# Patient Record
Sex: FEMALE | Race: WHITE | NOT HISPANIC OR LATINO | Employment: PART TIME | URBAN - METROPOLITAN AREA
[De-identification: names, ages, dates, MRNs, and addresses within clinical notes are randomized per-mention and may not be internally consistent; named-entity substitution may affect disease eponyms.]

---

## 2017-01-10 ENCOUNTER — OFFICE VISIT (OUTPATIENT)
Dept: MEDICAL GROUP | Facility: MEDICAL CENTER | Age: 62
End: 2017-01-10
Payer: MEDICARE

## 2017-01-10 VITALS
SYSTOLIC BLOOD PRESSURE: 126 MMHG | TEMPERATURE: 97.4 F | WEIGHT: 147 LBS | OXYGEN SATURATION: 94 % | HEIGHT: 68 IN | RESPIRATION RATE: 16 BRPM | DIASTOLIC BLOOD PRESSURE: 70 MMHG | BODY MASS INDEX: 22.28 KG/M2 | HEART RATE: 66 BPM

## 2017-01-10 DIAGNOSIS — H92.01 EAR PAIN, RIGHT: ICD-10-CM

## 2017-01-10 DIAGNOSIS — Z85.820 HISTORY OF MELANOMA: ICD-10-CM

## 2017-01-10 DIAGNOSIS — D75.1 POLYCYTHEMIA: ICD-10-CM

## 2017-01-10 DIAGNOSIS — Z12.31 VISIT FOR SCREENING MAMMOGRAM: ICD-10-CM

## 2017-01-10 DIAGNOSIS — K21.9 GASTROESOPHAGEAL REFLUX DISEASE WITHOUT ESOPHAGITIS: ICD-10-CM

## 2017-01-10 DIAGNOSIS — F31.81 BIPOLAR 2 DISORDER (HCC): Chronic | ICD-10-CM

## 2017-01-10 DIAGNOSIS — R73.02 IGT (IMPAIRED GLUCOSE TOLERANCE): ICD-10-CM

## 2017-01-10 DIAGNOSIS — R06.02 SOB (SHORTNESS OF BREATH): ICD-10-CM

## 2017-01-10 DIAGNOSIS — F17.210 CIGARETTE NICOTINE DEPENDENCE WITHOUT COMPLICATION: ICD-10-CM

## 2017-01-10 DIAGNOSIS — Z00.00 INITIAL MEDICARE ANNUAL WELLNESS VISIT: ICD-10-CM

## 2017-01-10 DIAGNOSIS — E78.5 DYSLIPIDEMIA: ICD-10-CM

## 2017-01-10 PROCEDURE — 99214 OFFICE O/P EST MOD 30 MIN: CPT | Mod: 25 | Performed by: INTERNAL MEDICINE

## 2017-01-10 PROCEDURE — G0439 PPPS, SUBSEQ VISIT: HCPCS | Performed by: INTERNAL MEDICINE

## 2017-01-10 RX ORDER — AZITHROMYCIN 250 MG/1
250 TABLET, FILM COATED ORAL DAILY
Qty: 6 TAB | Refills: 0 | Status: SHIPPED | OUTPATIENT
Start: 2017-01-10 | End: 2017-01-15

## 2017-01-10 ASSESSMENT — PATIENT HEALTH QUESTIONNAIRE - PHQ9: CLINICAL INTERPRETATION OF PHQ2 SCORE: 0

## 2017-01-10 NOTE — MR AVS SNAPSHOT
"        Aimee Guillen   1/10/2017 2:20 PM   Office Visit   MRN: [id number]    Department:  [address] [place] [address]   Dept Phone:  [phone]    Description:  Female : 1955   Provider:  Pepe West M.D.           Reason for Visit     Ear Fullness     Sinus Problem     Medication Refill           Allergies as of 1/10/2017     Allergen Noted Reactions    Codeine 2010         You were diagnosed with     Initial Medicare annual wellness visit   [7811929]       Bipolar 2 disorder (HCC)   [344120]       Dyslipidemia   [681860]       Tobacco abuse   [712144]       IGT (impaired glucose tolerance)   [149400]       Gastroesophageal reflux disease without esophagitis   [866388]       Polycythemia   [896590]       History of melanoma   [355614]       Ear pain, right   [990993]       Cigarette nicotine dependence without complication   [514424]       Visit for screening mammogram   [883251]       SOB (shortness of breath)   [230325]         Vital Signs     Blood Pressure Pulse Temperature Respirations Height Weight    126/70 mmHg 66 36.3 °C (97.4 °F) 16 1.727 m (5' 8\") 66.679 kg (147 lb)    Body Mass Index Oxygen Saturation Smoking Status             22.36 kg/m2 94% Current Every Day Smoker         Basic Information     Date Of Birth Sex Race Ethnicity Preferred Language    1955 Female White Non- English      Your appointments     2017  9:00 AM   ANNUAL EXAM PREVENTATIVE with SAM Amos   [facility] [address]    [address] [facility] [address]  [facility] [address]   [phone]              Problem List              ICD-10-CM Priority Class Noted - Resolved    Bipolar 2 disorder (HCC) (Chronic) F31.81   2010 - Present    Dyslipidemia E78.5   2010 - Present    Leukocytosis D72.829   2011 - Present    Polycythemia D75.1   2011 - Present    IGT (impaired glucose tolerance) R73.02   2012 - Present    Gastroesophageal reflux disease without " esophagitis K21.9   1/10/2017 - Present    Cigarette nicotine dependence without complication F17.210   1/10/2017 - Present      Health Maintenance        Date Due Completion Dates    IMM DTaP/Tdap/Td Vaccine (1 - Tdap) 12/15/1975 ---    MAMMOGRAM 9/18/2014 9/18/2013, 8/23/2012, 2/23/2012, 2/15/2012, 5/27/2010, 5/27/2010    PAP SMEAR 2/15/2015 2/15/2012    IMM ZOSTER VACCINE 12/15/2016 ---    COLONOSCOPY 3/18/2022 3/18/2012 (Done)    Override on 3/18/2012: Done            Current Immunizations     Influenza TIV (IM) 9/10/2016, 10/1/2012, 10/1/2010    Pneumococcal Vaccine (UF)Historical Data 10/1/2010    Pneumococcal polysaccharide vaccine (PPSV-23) 10/1/2010      Below and/or attached are the medications your provider expects you to take. Review all of your home medications and newly ordered medications with your provider and/or pharmacist. Follow medication instructions as directed by your provider and/or pharmacist. Please keep your medication list with you and share with your provider. Update the information when medications are discontinued, doses are changed, or new medications (including over-the-counter products) are added; and carry medication information at all times in the event of emergency situations     Allergies:  CODEINE - (reactions not documented)               Medications  Valid as of: January 10, 2017 -  2:48 PM    Generic Name Brand Name Tablet Size Instructions for use    Azithromycin (Tab) ZITHROMAX 250 MG Take 1 Tab by mouth every day for 5 days. Take 2 tabs on day 1        .                 Medicines prescribed today were sent to:     [facility] [id number]  [place], [place] - [address] AT [facility] [address] [address]    Phone: [phone] Fax: [phone]    Open 24 Hours?: No      Medication refill instructions:       If your prescription bottle indicates you have medication refills left, it is not necessary to call your provider’s office. Please  contact your pharmacy and they will refill your medication.    If your prescription bottle indicates you do not have any refills left, you may request refills at any time through one of the following ways: The online Mc4 system (except Urgent Care), by calling your provider’s office, or by asking your pharmacy to contact your provider’s office with a refill request. Medication refills are processed only during regular business hours and may not be available until the next business day. Your provider may request additional information or to have a follow-up visit with you prior to refilling your medication.   *Please Note: Medication refills are assigned a new Rx number when refilled electronically. Your pharmacy may indicate that no refills were authorized even though a new prescription for the same medication is available at the pharmacy. Please request the medicine by name with the pharmacy before contacting your provider for a refill.        Your To Do List     Future Labs/Procedures Complete By Expires    CBC WITH DIFFERENTIAL  As directed 1/11/2018    COMP METABOLIC PANEL  As directed 1/11/2018    LIPID PROFILE  As directed 1/11/2018    MA-SCREEN MAMMO W/CAD-BILAT  As directed 2/11/2018      Referral     A referral request has been sent to our patient care coordination department. Please allow 3-5 business days for us to process this request and contact you either by phone or mail. If you do not hear from us by the 5th business day, please call us at [phone].           Mc4 Access Code: Activation code not generated  Current Mc4 Status: Active

## 2017-01-10 NOTE — PROGRESS NOTES
CC: Reestablish care multiple issues.    HPI:   Aimee presents today with the following.    1. Initial Medicare annual wellness visit  Screenings performed below.    2. Ear pain, right  Main complaint is one month of right-sided ear pain. She denies any fevers or chills but does have some sinus congestion. She notices her ears are severely muffled with some mild ringing but no dizziness. She has no fevers or chills no cough.    3. SOB (shortness of breath)  She does have a long smoking history and does admit to shortness of breath with activity but no chest pain. Over 40-pack-year smoking history without having had pulmonary function test. Denies any acute worsening with the current illness.    4. Polycythemia  Polycythemic and past found to be hypoxic at night with regulation for night maternal O2 which she declines.    5. Cigarette nicotine dependence without complication  Over 40-pack-year smoking history without signs of cancers never had lung cancer screening.    6. Bipolar 2 disorder (HCC)  Mood is clinically doing well she's never seen psychiatry but never had any major breaks.    7. Dyslipidemia  Maintained on statin in the past has not taken for several months but due for blood work.    8. IGT (impaired glucose tolerance)  Blood sugars elevated in the past as well due for repeat blood work.    9. Gastroesophageal reflux disease without esophagitis  Reports symptoms are currently stable no difficulty swallowing of blood disorder or tarry stool.    10. History of melanoma  [place] saw dermatologist diagnosed with noninvasive melanoma does need annual skin checks.    11. Visit for screening mammogram        Depression Screening    Little interest or pleasure in doing things?  0 - not at all  Feeling down, depressed , or hopeless? 0 - not at all  Trouble falling or staying asleep, or sleeping too much?     Feeling tired or having little energy?     Poor appetite or overeating?     Feeling bad  about yourself - or that you are a failure or have let yourself or your family down?    Trouble concentrating on things, such as reading the newspaper or watching television?    Moving or speaking so slowly that other people could have noticed.  Or the opposite - being so fidgety or restless that you have been moving around a lot more than usual?     Thoughts that you would be better off dead, or of hurting yourself?     Patient Health Questionnaire Score:      If depressive symptoms identified deferred to follow up visit unless specifically addressed in assesment and plan.      Screening for Cognitive Impairment    Three Minute Recall (banana, sunrise, fence)  3/3    Draw clock face with all 12 numbers set to the hand to show 10 minures past 11 o'clock  1 5/5  Cognitive concerns identified defferred for follow up unless specifically addressed in assesment and plan.    Fall Risk Assessment    Has the patient had two or more falls in the last year or any fall with injury in the last year?  No    Safety Assessment    Throw rugs on floor.  No  Handrails on all stairs.  No  Good lighting in all hallways.  Yes  Difficulty hearing.  No  Patient counseled about all safety risks that were identified.    Functional Assessment ADLs    Are there any barriers preventing you from cooking for yourself or meeting nutritional needs?  No.    Are there any barriers preventing you from driving safely or obtaining transportation?  No.    Are there any barriers preventing you from using a telephone or calling for help?  No.    Are there any barriers preventing you from shopping?  No.    Are there any barriers preventing you from taking care of your own finances?  No.    Are there any barriers preventing you from managing your medications?  No.    Are currently engaing any exercise or physical activity?  Yes.       Health Maintenance Summary                Annual Wellness Visit Overdue 1955     IMM DTaP/Tdap/Td Vaccine Overdue  "12/15/1975     LUNG CANCER SCREENING Overdue 12/15/2011     MAMMOGRAM Overdue 9/18/2014      Done 9/18/2013 MA-BILAT DIAGNOSTIC MAMMO W/CAD     Patient has more history with this topic...    PAP SMEAR Overdue 2/15/2015      Done 2/15/2012 PAP IG, RFX HPV ASCU    IMM ZOSTER VACCINE Overdue 12/15/2016     COLONOSCOPY Next Due 3/18/2022      Done 3/18/2012           Patient Care Team:  Pepe West M.D. as PCP - General      Patient Active Problem List    Diagnosis Date Noted   • Gastroesophageal reflux disease without esophagitis 01/10/2017   • Cigarette nicotine dependence without complication 01/10/2017   • IGT (impaired glucose tolerance) 11/19/2012   • Leukocytosis 05/31/2011   • Polycythemia 05/31/2011   • Bipolar 2 disorder (HCC) 04/30/2010   • Dyslipidemia 04/30/2010       Current Outpatient Prescriptions   Medication Sig Dispense Refill   • azithromycin (ZITHROMAX Z-BRIANA) 250 MG Tab Take 1 Tab by mouth every day for 5 days. Take 2 tabs on day 1 6 Tab 0     No current facility-administered medications for this visit.         Allergies as of 01/10/2017 - Eddie as Reviewed 01/10/2017   Allergen Reaction Noted   • Codeine  05/20/2010        ROS: As per HPI.    /70 mmHg  Pulse 66  Temp(Src) 36.3 °C (97.4 °F)  Resp 16  Ht 1.727 m (5' 8\")  Wt 66.679 kg (147 lb)  BMI 22.36 kg/m2  SpO2 94%    Physical Exam:  Gen:         Alert and oriented, No apparent distress.  Heent:       TMs clear bilaterally, oropharynx without erythema or exudates  Neck:        No Lymphadenopathy or Bruits.  Lungs:     Clear to auscultation bilaterally  CV:          Regular rate and rhythm. No murmurs, rubs or gallops.  Abd:         Soft non tender, non distended. Normal active bowel sounds.  No  Hepatosplenomegaly, No pulsatile masses.                   Ext:          No clubbing, cyanosis, edema.      Assessment and Plan.   61 y.o. female with the following issues.    1. Initial Medicare annual wellness visit  Discussed healthy " lifestyle habits as well as screening regimens. Discussed advanced directives  - Initial Wellness Visit - Includes PPPS ()    2. Ear pain, right  No obvious findings were given here loss and duration of symptoms have placed on anti-biotic if not improving referral to ENT.  - azithromycin (ZITHROMAX Z-BRIAAN) 250 MG Tab; Take 1 Tab by mouth every day for 5 days. Take 2 tabs on day 1  Dispense: 6 Tab; Refill: 0    3. SOB (shortness of breath)  Nothing acute nature have written for pulmonary function test.  - PULMONARY FUNCTION TESTS Test requested: Complete Pulmonary Function Test    4. Polycythemia  Rechecking CBC.  - Initial Wellness Visit - Includes PPPS ()  - CBC WITH DIFFERENTIAL; Future    5. Cigarette nicotine dependence without complication  Given her duration of smoking have placed referral to lung cancer screening program.  - REFERRAL TO LUNG CANCER SCREENING PROGRAM  - PULMONARY FUNCTION TESTS Test requested: Complete Pulmonary Function Test    6. Bipolar 2 disorder (HCC)  Clinical history stable no change to therapy  - Initial Wellness Visit - Includes PPPS ()    7. Dyslipidemia  Recheck cholesterol likely start back on statin.  - Initial Wellness Visit - Includes PPPS ()  - COMP METABOLIC PANEL; Future  - LIPID PROFILE; Future    8. IGT (impaired glucose tolerance)  Rechecking blood work with A1c.  - Initial Wellness Visit - Includes PPPS ()  - HEMOGLOBIN A1C; Future    9. Gastroesophageal reflux disease without esophagitis  Clinically stable no change to therapy.  - Initial Wellness Visit - Includes PPPS ()    10. History of melanoma  Referring to dermatology.  - REFERRAL TO DERMATOLOGY    11. Visit for screening mammogram    - MA-SCREEN MAMMO W/CAD-BILAT; Future

## 2017-01-16 RX ORDER — CIPROFLOXACIN 0.5 MG/.25ML
2 SOLUTION/ DROPS AURICULAR (OTIC) EVERY 6 HOURS
Qty: 1 EACH | Refills: 0 | Status: SHIPPED | OUTPATIENT
Start: 2017-01-16 | End: 2018-01-17

## 2017-01-17 ENCOUNTER — HOSPITAL ENCOUNTER (OUTPATIENT)
Dept: LAB | Facility: MEDICAL CENTER | Age: 62
End: 2017-01-17
Attending: INTERNAL MEDICINE
Payer: MEDICARE

## 2017-01-17 DIAGNOSIS — E78.5 DYSLIPIDEMIA: ICD-10-CM

## 2017-01-17 DIAGNOSIS — D75.1 POLYCYTHEMIA: ICD-10-CM

## 2017-01-17 PROBLEM — K21.9 GASTROESOPHAGEAL REFLUX DISEASE WITHOUT ESOPHAGITIS: Status: RESOLVED | Noted: 2017-01-10 | Resolved: 2017-01-17

## 2017-01-17 LAB
ALBUMIN SERPL BCP-MCNC: 4.5 G/DL (ref 3.2–4.9)
ALBUMIN/GLOB SERPL: 1.5 G/DL
ALP SERPL-CCNC: 97 U/L (ref 30–99)
ALT SERPL-CCNC: 13 U/L (ref 2–50)
ANION GAP SERPL CALC-SCNC: 5 MMOL/L (ref 0–11.9)
AST SERPL-CCNC: 18 U/L (ref 12–45)
BASOPHILS # BLD AUTO: 0.13 K/UL (ref 0–0.12)
BASOPHILS NFR BLD AUTO: 1.2 % (ref 0–1.8)
BILIRUB SERPL-MCNC: 0.5 MG/DL (ref 0.1–1.5)
BUN SERPL-MCNC: 16 MG/DL (ref 8–22)
CALCIUM SERPL-MCNC: 9.7 MG/DL (ref 8.5–10.5)
CHLORIDE SERPL-SCNC: 106 MMOL/L (ref 96–112)
CHOLEST SERPL-MCNC: 294 MG/DL (ref 100–199)
CO2 SERPL-SCNC: 28 MMOL/L (ref 20–33)
CREAT SERPL-MCNC: 0.78 MG/DL (ref 0.5–1.4)
EOSINOPHIL # BLD: 0.15 K/UL (ref 0–0.51)
EOSINOPHIL NFR BLD AUTO: 1.4 % (ref 0–6.9)
ERYTHROCYTE [DISTWIDTH] IN BLOOD BY AUTOMATED COUNT: 48.2 FL (ref 35.9–50)
GLOBULIN SER CALC-MCNC: 3 G/DL (ref 1.9–3.5)
GLUCOSE SERPL-MCNC: 94 MG/DL (ref 65–99)
HCT VFR BLD AUTO: 50 % (ref 37–47)
HDLC SERPL-MCNC: 41 MG/DL
HGB BLD-MCNC: 17.5 G/DL (ref 12–16)
IMM GRANULOCYTES # BLD AUTO: 0.03 K/UL (ref 0–0.11)
IMM GRANULOCYTES NFR BLD AUTO: 0.3 % (ref 0–0.9)
LDLC SERPL CALC-MCNC: 186 MG/DL
LYMPHOCYTES # BLD: 3.26 K/UL (ref 1–4.8)
LYMPHOCYTES NFR BLD AUTO: 29.7 % (ref 22–41)
MCH RBC QN AUTO: 32.7 PG (ref 27–33)
MCHC RBC AUTO-ENTMCNC: 35 G/DL (ref 33.6–35)
MCV RBC AUTO: 93.5 FL (ref 81.4–97.8)
MONOCYTES # BLD: 0.55 K/UL (ref 0–0.85)
MONOCYTES NFR BLD AUTO: 5 % (ref 0–13.4)
NEUTROPHILS # BLD: 6.85 K/UL (ref 2–7.15)
NEUTROPHILS NFR BLD AUTO: 62.4 % (ref 44–72)
NRBC # BLD AUTO: 0 K/UL
NRBC BLD-RTO: 0 /100 WBC
PLATELET # BLD AUTO: 213 K/UL (ref 164–446)
PMV BLD AUTO: 12.5 FL (ref 9–12.9)
POTASSIUM SERPL-SCNC: 4.4 MMOL/L (ref 3.6–5.5)
PROT SERPL-MCNC: 7.5 G/DL (ref 6–8.2)
RBC # BLD AUTO: 5.35 M/UL (ref 4.2–5.4)
SODIUM SERPL-SCNC: 139 MMOL/L (ref 135–145)
TRIGL SERPL-MCNC: 337 MG/DL (ref 0–149)
WBC # BLD AUTO: 11 K/UL (ref 4.8–10.8)

## 2017-01-17 PROCEDURE — 80061 LIPID PANEL: CPT

## 2017-01-17 PROCEDURE — 36415 COLL VENOUS BLD VENIPUNCTURE: CPT

## 2017-01-17 PROCEDURE — 85025 COMPLETE CBC W/AUTO DIFF WBC: CPT

## 2017-01-17 PROCEDURE — 80053 COMPREHEN METABOLIC PANEL: CPT

## 2017-01-19 ENCOUNTER — OFFICE VISIT (OUTPATIENT)
Dept: HEMATOLOGY ONCOLOGY | Facility: MEDICAL CENTER | Age: 62
End: 2017-01-19
Payer: MEDICARE

## 2017-01-19 VITALS
BODY MASS INDEX: 22.55 KG/M2 | HEIGHT: 68 IN | OXYGEN SATURATION: 99 % | RESPIRATION RATE: 16 BRPM | WEIGHT: 148.8 LBS | TEMPERATURE: 98.4 F | SYSTOLIC BLOOD PRESSURE: 108 MMHG | DIASTOLIC BLOOD PRESSURE: 80 MMHG | HEART RATE: 82 BPM

## 2017-01-19 DIAGNOSIS — F17.210 CIGARETTE SMOKER: ICD-10-CM

## 2017-01-19 ASSESSMENT — ENCOUNTER SYMPTOMS
WEIGHT LOSS: 0
HEMOPTYSIS: 0
COUGH: 0
SPUTUM PRODUCTION: 0
WHEEZING: 0
FEVER: 0
SHORTNESS OF BREATH: 1
CHILLS: 0

## 2017-01-19 NOTE — PROGRESS NOTES
"Subjective:   Date of Consultation:1/19/2017  8:57 AM  Primary care physician:Pepe eWst M.D.    Patient seen today for initial lung cancer screening visit. Patient referred by Dr. Pepe West.    The patient meets eligibility criteria including age, smoking history (30+ pack years), if former smoker, quit in the last 15 years, and absence of signs or symptoms of lung cancer.    - Age   - Smoking history - Patient has smoked for 60years at an average of 46 ppd = 1 pack year smoking history.  - Current smoking status - Current smoker.  Patient has not attempted to quit.  - No symptoms of lung cancer and no previous history of lung cancer           Past Medical History   Diagnosis Date   • Bipolar 2 disorder (CMS-HCC) 4/30/2010   • HTN (hypertension), benign 4/30/2010     Past Surgical History   Procedure Laterality Date   • Cholecystectomy     • Primary c section  1978     Allergies   Allergen Reactions   • Codeine         History   Smoking status   • Current Every Day Smoker -- 1.00 packs/day for 46 years   • Types: Cigarettes   • Start date: 11/15/1971   Smokeless tobacco   • Never Used     History   Alcohol Use No         Family History   Problem Relation Age of Onset   • Cancer Mother      breast   • Cancer Sister      colon   • Heart Disease Maternal Grandmother    • Cancer Father      stomach       Review of Systems   Constitutional: Negative for fever, chills, weight loss and malaise/fatigue.   Respiratory: Positive for shortness of breath (with activity). Negative for cough, hemoptysis, sputum production and wheezing.         Patient c/o SOB which started 10 years ago and becoming worse.  Patient does not have a diagnosis of COPD/Asthma or medication to alleviate her symptom.        Objective:   /80 mmHg  Pulse 82  Temp(Src) 36.9 °C (98.4 °F)  Resp 16  Ht 1.727 m (5' 7.99\")  Wt 67.495 kg (148 lb 12.8 oz)  BMI 22.63 kg/m2  SpO2 99%  Breastfeeding? No    Physical Exam    Assessment:     We " conducted a shared decision-making process using a decision aid. We reviewed benefits and harms of screening, including false positives and potential need for additional diagnostic testing, the possibility of over diagnosis, and total radiation exposure.    We discussed the importance of adhering to annual LDCT screening. We also discussed the impact of comorbities on the patient's the ability or willingness to undergo diagnostic procedure(s) and treatment.    Counseling on the importance of maintaining cigarette smoking abstinence if former smoker; or the importance of smoking cessation if current smoker and, if appropriate, furnishing of information about tobacco cessation interventions.    Based on our discussion, we have decided not to initiate regular lung cancer screening.    Medical Decision Making:  Today's Assessment / Status / Plan:   Patient is not eligible for the low dose CT scan as she does not have a diagnosis  to explain her SOB which is progressively becoming worse.  I will notify Dr. West.

## 2017-01-19 NOTE — MR AVS SNAPSHOT
"        Aimee SANTIAGO Mindy   2017 9:00 AM   Office Visit   MRN: [id number]    Department:  [facility]   Dept Phone:  [phone]    Description:  Female : 1955   Provider:  SAM Ruiz           Reason for Visit     Other Lung Ca screening      Allergies as of 2017     Allergen Noted Reactions    Codeine 2010         You were diagnosed with     Cigarette smoker   [550749]         Vital Signs     Blood Pressure Pulse Temperature Respirations Height Weight    108/80 mmHg 82 36.9 °C (98.4 °F) 16 1.727 m (5' 7.99\") 67.495 kg (148 lb 12.8 oz)    Body Mass Index Oxygen Saturation Breastfeeding? Smoking Status          22.63 kg/m2 99% No Current Every Day Smoker        Basic Information     Date Of Birth Sex Race Ethnicity Preferred Language    1955 Female White Non- English      Your appointments     2017  2:00 PM   Pulmonary Function Test with PULMONARY FUNCTION LAB   PULMONARY LAB OP [facility] (--)    [address] [place] [address]   [phone]            2017  9:00 AM   ANNUAL EXAM PREVENTATIVE with SAM Amos   [facility] [address]    [address]  [address]  [place] [address]   [phone]            Mar 03, 2017  9:10 AM   MA [id number] with [facility] MG 3   [facility] ([address]    [address]  [address]  [place] [address]   [phone]           No deodorant, powder, perfume or lotion under the arm or breast area.              Problem List              ICD-10-CM Priority Class Noted - Resolved    Bipolar 2 disorder (CMS-HCC) (Chronic) F31.81   2010 - Present    Dyslipidemia E78.5   2010 - Present    Leukocytosis D72.829   2011 - Present    Polycythemia D75.1   2011 - Present    Cigarette nicotine dependence without complication F17.210   1/10/2017 - Present      Health Maintenance        Date Due Completion Dates    IMM DTaP/Tdap/Td Vaccine (1 - Tdap) 12/15/1975 " ---    MAMMOGRAM 9/18/2014 9/18/2013, 8/23/2012, 2/23/2012, 2/15/2012, 5/27/2010, 5/27/2010    PAP SMEAR 2/15/2015 2/15/2012    IMM ZOSTER VACCINE 12/15/2016 ---    COLONOSCOPY 3/18/2022 3/18/2012 (Done)    Override on 3/18/2012: Done            Current Immunizations     Influenza TIV (IM) 9/10/2016, 10/1/2012, 10/1/2010    Pneumococcal Vaccine (UF)Historical Data 10/1/2010    Pneumococcal polysaccharide vaccine (PPSV-23) 10/1/2010      Below and/or attached are the medications your provider expects you to take. Review all of your home medications and newly ordered medications with your provider and/or pharmacist. Follow medication instructions as directed by your provider and/or pharmacist. Please keep your medication list with you and share with your provider. Update the information when medications are discontinued, doses are changed, or new medications (including over-the-counter products) are added; and carry medication information at all times in the event of emergency situations     Allergies:  CODEINE - (reactions not documented)               Medications  Valid as of: January 19, 2017 -  9:58 AM    Generic Name Brand Name Tablet Size Instructions for use    Ciprofloxacin HCl (Solution) Ciprofloxacin HCl 0.2 % Place 2 Drops in ear every 6 hours.        .                 Medicines prescribed today were sent to:     [facility] [address] [place] [address] [address] [facility] [address]    [facility] [address] [facility] [address]    Phone: [phone] Fax: [phone]    Open 24 Hours?: No      Medication refill instructions:       If your prescription bottle indicates you have medication refills left, it is not necessary to call your provider’s office. Please contact your pharmacy and they will refill your medication.    If your prescription bottle indicates you do not have any refills left, you may request refills at any time through one of the following ways: The online Synup system (except  Urgent Care), by calling your provider’s office, or by asking your pharmacy to contact your provider’s office with a refill request. Medication refills are processed only during regular business hours and may not be available until the next business day. Your provider may request additional information or to have a follow-up visit with you prior to refilling your medication.   *Please Note: Medication refills are assigned a new Rx number when refilled electronically. Your pharmacy may indicate that no refills were authorized even though a new prescription for the same medication is available at the pharmacy. Please request the medicine by name with the pharmacy before contacting your provider for a refill.           [employer] System[employer] Access Code: Activation code not generated  Current SodaHead Status: Active

## 2017-01-23 ENCOUNTER — APPOINTMENT (OUTPATIENT)
Dept: OTHER | Facility: MEDICAL CENTER | Age: 62
End: 2017-01-23
Payer: MEDICARE

## 2017-01-23 ENCOUNTER — OFFICE VISIT (OUTPATIENT)
Dept: MEDICAL GROUP | Facility: MEDICAL CENTER | Age: 62
End: 2017-01-23
Payer: MEDICARE

## 2017-01-23 VITALS
BODY MASS INDEX: 22.28 KG/M2 | TEMPERATURE: 97.3 F | HEART RATE: 72 BPM | HEIGHT: 68 IN | RESPIRATION RATE: 16 BRPM | DIASTOLIC BLOOD PRESSURE: 64 MMHG | SYSTOLIC BLOOD PRESSURE: 130 MMHG | WEIGHT: 147 LBS | OXYGEN SATURATION: 97 %

## 2017-01-23 DIAGNOSIS — E78.5 DYSLIPIDEMIA: ICD-10-CM

## 2017-01-23 DIAGNOSIS — H93.8X1 EAR FULLNESS, RIGHT: ICD-10-CM

## 2017-01-23 DIAGNOSIS — F17.210 CIGARETTE NICOTINE DEPENDENCE WITHOUT COMPLICATION: ICD-10-CM

## 2017-01-23 PROBLEM — J44.9 CHRONIC OBSTRUCTIVE PULMONARY DISEASE (HCC): Status: ACTIVE | Noted: 2017-01-23

## 2017-01-23 PROCEDURE — 99214 OFFICE O/P EST MOD 30 MIN: CPT | Performed by: INTERNAL MEDICINE

## 2017-01-23 RX ORDER — ATORVASTATIN CALCIUM 40 MG/1
40 TABLET, FILM COATED ORAL DAILY
Qty: 90 TAB | Refills: 3 | Status: SHIPPED | OUTPATIENT
Start: 2017-01-23 | End: 2017-12-01 | Stop reason: SDUPTHER

## 2017-01-23 RX ORDER — FLUTICASONE PROPIONATE 50 MCG
1 SPRAY, SUSPENSION (ML) NASAL DAILY
Qty: 16 G | Refills: 6 | Status: SHIPPED | OUTPATIENT
Start: 2017-01-23 | End: 2018-01-17

## 2017-01-23 ASSESSMENT — PATIENT HEALTH QUESTIONNAIRE - PHQ9: CLINICAL INTERPRETATION OF PHQ2 SCORE: 0

## 2017-01-23 NOTE — PROGRESS NOTES
"CC: Follow-up multiple issues    HPI:   Aimee presents today with the following.    1. Dyslipidemia  Presents after having blood work showing a significantly elevated LDL. She has been on statins in the past without myalgias. She denies any chest pain or shortness of breath no edema.    2. Cigarette nicotine dependence without complication  Significant smoking history and she does have some dyspnea on exertion however with her smoking history this certainly is not a sign or symptom of cancer. She is willing to undergo treatment if she does have an abnormal finding on CT. Shared decision making with the nurse was declined however she is very interested in the CT.    3. Ear fullness, right  She is complaining of persistent right ear fullness. She was given some drops which were not helpful. She has no fevers or chills no other infectious symptoms.      Patient Active Problem List    Diagnosis Date Noted   • Chronic obstructive pulmonary disease (CMS-HCC) 01/23/2017   • Cigarette nicotine dependence without complication 01/10/2017   • Polycythemia 05/31/2011   • Bipolar 2 disorder (CMS-HCC) 04/30/2010   • Dyslipidemia 04/30/2010       Current Outpatient Prescriptions   Medication Sig Dispense Refill   • atorvastatin (LIPITOR) 40 MG Tab Take 1 Tab by mouth every day. 90 Tab 3   • fluticasone (FLONASE) 50 MCG/ACT nasal spray Spray 1 Spray in nose every day. 16 g 6   • Ciprofloxacin HCl 0.2 % Solution Place 2 Drops in ear every 6 hours. 1 Each 0     No current facility-administered medications for this visit.         Allergies as of 01/23/2017 - Eddie as Reviewed 01/23/2017   Allergen Reaction Noted   • Codeine  05/20/2010        ROS: As per HPI.    /64 mmHg  Pulse 72  Temp(Src) 36.3 °C (97.3 °F)  Resp 16  Ht 1.727 m (5' 8\")  Wt 66.679 kg (147 lb)  BMI 22.36 kg/m2  SpO2 97%    Physical Exam:  Gen:         Alert and oriented, No apparent distress.  Heent:       TMs clear bilaterally, oropharynx without " erythema or exudates  Neck:        No Lymphadenopathy or Bruits.  Lungs:     Clear to auscultation bilaterally  CV:          Regular rate and rhythm. No murmurs, rubs or gallops.               Ext:          No clubbing, cyanosis, edema.      Assessment and Plan.   61 y.o. female with the following issues.    1. Dyslipidemia  Starting on Lipitor 40 mg cautioned about side effects recheck 3 months.  - COMP METABOLIC PANEL; Future  - LIPID PROFILE; Future    2. Cigarette nicotine dependence without complication  Shared decision making today do not constitute any of her symptoms and signs of cancer have reordered the CT. She also has spirometry pending for what is likely COPD given her long history of smoking.  - CT-LUNG CANCER-SCREENING; Future    3. Ear fullness, right  Likely eustachian tube dysfunction have started on nasal steroid-dependent.  - fluticasone (FLONASE) 50 MCG/ACT nasal spray; Spray 1 Spray in nose every day.  Dispense: 16 g; Refill: 6

## 2017-01-23 NOTE — MR AVS SNAPSHOT
"Susanerine JACK Mindy   2017 9:20 AM   Office Visit   MRN: [id number]    Department:  [address] [place] [address]   Dept Phone:  [phone]    Description:  Female : 1955   Provider:  Pepe West M.D.           Reason for Visit     Follow-Up Labs      Allergies as of 2017     Allergen Noted Reactions    Codeine 2010         You were diagnosed with     Dyslipidemia   [454365]       Cigarette nicotine dependence without complication   [365034]       Ear fullness, right   [7592443]         Vital Signs     Blood Pressure Pulse Temperature Respirations Height Weight    130/64 mmHg 72 36.3 °C (97.3 °F) 16 1.727 m (5' 8\") 66.679 kg (147 lb)    Body Mass Index Oxygen Saturation Smoking Status             22.36 kg/m2 97% Current Every Day Smoker         Basic Information     Date Of Birth Sex Race Ethnicity Preferred Language    1955 Female White Non- English      Your appointments     2017  2:00 PM   Pulmonary Function Test with PULMONARY FUNCTION LAB   PULMONARY LAB OP [facility] (--)    [address] [facility] [place] [address]   [phone]            2017  9:00 AM   ANNUAL EXAM PREVENTATIVE with SAM Amos   [facility] [address]    [address]  [address]  [place] [address] [phone]            Mar 03, 2017  9:10 AM   MA [id number] with RBHC MG 3   [facility] [address]    [address]  [place] [address] [phone]           No deodorant, powder, perfume or lotion under the arm or breast area.              Problem List              ICD-10-CM Priority Class Noted - Resolved    Bipolar 2 disorder (CMS-HCC) (Chronic) F31.81   2010 - Present    Dyslipidemia E78.5   2010 - Present    Polycythemia D75.1   2011 - Present    Cigarette nicotine dependence without complication F17.210   1/10/2017 - Present    Chronic obstructive pulmonary disease (CMS-HCC) J44.9   2017 - Present      "   Health Maintenance        Date Due Completion Dates    IMM DTaP/Tdap/Td Vaccine (1 - Tdap) 12/15/1975 ---    MAMMOGRAM 9/18/2014 9/18/2013, 8/23/2012, 2/23/2012, 2/15/2012, 5/27/2010, 5/27/2010    PAP SMEAR 2/15/2015 2/15/2012    IMM ZOSTER VACCINE 12/15/2016 ---    COLONOSCOPY 3/18/2022 3/18/2012 (Done)    Override on 3/18/2012: Done            Current Immunizations     Influenza TIV (IM) 9/10/2016, 10/1/2012, 10/1/2010    Pneumococcal Vaccine (UF)Historical Data 10/1/2010    Pneumococcal polysaccharide vaccine (PPSV-23) 10/1/2010      Below and/or attached are the medications your provider expects you to take. Review all of your home medications and newly ordered medications with your provider and/or pharmacist. Follow medication instructions as directed by your provider and/or pharmacist. Please keep your medication list with you and share with your provider. Update the information when medications are discontinued, doses are changed, or new medications (including over-the-counter products) are added; and carry medication information at all times in the event of emergency situations     Allergies:  CODEINE - (reactions not documented)               Medications  Valid as of: January 23, 2017 -  9:44 AM    Generic Name Brand Name Tablet Size Instructions for use    Atorvastatin Calcium (Tab) LIPITOR 40 MG Take 1 Tab by mouth every day.        Ciprofloxacin HCl (Solution) Ciprofloxacin HCl 0.2 % Place 2 Drops in ear every 6 hours.        Fluticasone Propionate (Suspension) FLONASE 50 MCG/ACT Spray 1 Spray in nose every day.        .                 Medicines prescribed today were sent to:     [id number]  [place] - [address] [facility] [address] [place] [address]    Phone: [phone] Fax: [phone]    Open 24 Hours?: No      Medication refill instructions:       If your prescription bottle indicates you have medication refills left, it is not necessary to  call your provider’s office. Please contact your pharmacy and they will refill your medication.    If your prescription bottle indicates you do not have any refills left, you may request refills at any time through one of the following ways: The online Sypherlink system (except Urgent Care), by calling your provider’s office, or by asking your pharmacy to contact your provider’s office with a refill request. Medication refills are processed only during regular business hours and may not be available until the next business day. Your provider may request additional information or to have a follow-up visit with you prior to refilling your medication.   *Please Note: Medication refills are assigned a new Rx number when refilled electronically. Your pharmacy may indicate that no refills were authorized even though a new prescription for the same medication is available at the pharmacy. Please request the medicine by name with the pharmacy before contacting your provider for a refill.        Your To Do List     Future Labs/Procedures Complete By Expires    COMP METABOLIC PANEL  As directed 1/24/2018    CT-LUNG CANCER-SCREENING  As directed 1/23/2018    LIPID PROFILE  As directed 1/24/2018         Sypherlink Access Code: Activation code not generated  Current Sypherlink Status: Active

## 2017-01-25 ENCOUNTER — HOSPITAL ENCOUNTER (OUTPATIENT)
Facility: MEDICAL CENTER | Age: 62
End: 2017-01-25
Attending: NURSE PRACTITIONER
Payer: MEDICARE

## 2017-01-25 ENCOUNTER — OFFICE VISIT (OUTPATIENT)
Dept: MEDICAL GROUP | Facility: MEDICAL CENTER | Age: 62
End: 2017-01-25
Payer: MEDICARE

## 2017-01-25 VITALS
WEIGHT: 145.4 LBS | OXYGEN SATURATION: 95 % | DIASTOLIC BLOOD PRESSURE: 62 MMHG | HEIGHT: 68 IN | SYSTOLIC BLOOD PRESSURE: 110 MMHG | BODY MASS INDEX: 22.04 KG/M2 | HEART RATE: 76 BPM

## 2017-01-25 DIAGNOSIS — Z11.51 SPECIAL SCREENING EXAMINATION FOR HUMAN PAPILLOMAVIRUS (HPV): ICD-10-CM

## 2017-01-25 DIAGNOSIS — Z01.419 WELL WOMAN EXAM WITH ROUTINE GYNECOLOGICAL EXAM: ICD-10-CM

## 2017-01-25 DIAGNOSIS — N39.0 UTI (URINARY TRACT INFECTION), UNCOMPLICATED: ICD-10-CM

## 2017-01-25 DIAGNOSIS — Z12.4 PAP SMEAR FOR CERVICAL CANCER SCREENING: ICD-10-CM

## 2017-01-25 LAB
APPEARANCE UR: CLEAR
BILIRUB UR STRIP-MCNC: ABNORMAL MG/DL
COLOR UR AUTO: YELLOW
GLUCOSE UR STRIP.AUTO-MCNC: ABNORMAL MG/DL
KETONES UR STRIP.AUTO-MCNC: ABNORMAL MG/DL
LEUKOCYTE ESTERASE UR QL STRIP.AUTO: ABNORMAL
NITRITE UR QL STRIP.AUTO: ABNORMAL
PH UR STRIP.AUTO: 6.5 [PH] (ref 5–8)
PROT UR QL STRIP: ABNORMAL MG/DL
RBC UR QL AUTO: ABNORMAL
SP GR UR STRIP.AUTO: 1.01
UROBILINOGEN UR STRIP-MCNC: ABNORMAL MG/DL

## 2017-01-25 PROCEDURE — 87624 HPV HI-RISK TYP POOLED RSLT: CPT

## 2017-01-25 PROCEDURE — 87480 CANDIDA DNA DIR PROBE: CPT

## 2017-01-25 PROCEDURE — G8420 CALC BMI NORM PARAMETERS: HCPCS | Performed by: NURSE PRACTITIONER

## 2017-01-25 PROCEDURE — 99213 OFFICE O/P EST LOW 20 MIN: CPT | Mod: 25 | Performed by: NURSE PRACTITIONER

## 2017-01-25 PROCEDURE — 81002 URINALYSIS NONAUTO W/O SCOPE: CPT | Performed by: NURSE PRACTITIONER

## 2017-01-25 PROCEDURE — 87660 TRICHOMONAS VAGIN DIR PROBE: CPT

## 2017-01-25 PROCEDURE — 87086 URINE CULTURE/COLONY COUNT: CPT

## 2017-01-25 PROCEDURE — G8482 FLU IMMUNIZE ORDER/ADMIN: HCPCS | Performed by: NURSE PRACTITIONER

## 2017-01-25 PROCEDURE — 4004F PT TOBACCO SCREEN RCVD TLK: CPT | Performed by: NURSE PRACTITIONER

## 2017-01-25 PROCEDURE — G0101 CA SCREEN;PELVIC/BREAST EXAM: HCPCS | Mod: 25 | Performed by: NURSE PRACTITIONER

## 2017-01-25 PROCEDURE — 88175 CYTOPATH C/V AUTO FLUID REDO: CPT

## 2017-01-25 PROCEDURE — 87510 GARDNER VAG DNA DIR PROBE: CPT

## 2017-01-25 PROCEDURE — 3017F COLORECTAL CA SCREEN DOC REV: CPT | Performed by: NURSE PRACTITIONER

## 2017-01-25 PROCEDURE — 3014F SCREEN MAMMO DOC REV: CPT | Performed by: NURSE PRACTITIONER

## 2017-01-25 RX ORDER — SULFAMETHOXAZOLE AND TRIMETHOPRIM 800; 160 MG/1; MG/1
1 TABLET ORAL 2 TIMES DAILY
Qty: 20 TAB | Refills: 0 | Status: SHIPPED | OUTPATIENT
Start: 2017-01-25 | End: 2017-02-04

## 2017-01-25 NOTE — MR AVS SNAPSHOT
"Aimee Guillen   2017 9:00 AM   Office Visit   MRN: [id number]    Department:  [address] [place] [address]   Dept Phone:  [phone]    Description:  Female : 1955   Provider:  SAM Amos           Reason for Visit     Gynecologic Exam     Urinary Pain 2 episodes of painful urination in the morning over the last year      Allergies as of 2017     Allergen Noted Reactions    Codeine 2010         You were diagnosed with     UTI (urinary tract infection), uncomplicated   [990357]   poct urine is pos. today for leukocytes and trace blood. We'll send for culture. Start Bactrim DS 10 days. Follow-up after antibiotics for recheck. ER precautio    Pap smear for cervical cancer screening   [300079]       Well woman exam with routine gynecological exam   [552509]       Special screening examination for human papillomavirus (HPV)   [V73.81.ICD-9-CM]         Vital Signs     Blood Pressure Pulse Height Weight Body Mass Index Oxygen Saturation    110/62 mmHg 76 1.727 m (5' 7.99\") 65.953 kg (145 lb 6.4 oz) 22.11 kg/m2 95%    Breastfeeding? Smoking Status                No Current Every Day Smoker          Basic Information     Date Of Birth Sex Race Ethnicity Preferred Language    1955 Female White Non- English      Your appointments     2017 10:40 AM   Established Patient with SAM Amos   [facility] [address]    [address] [facility] [address]  [place] [address] [phone]           You will be receiving a confirmation call a few days before your appointment from our automated call confirmation system.            2017  8:00 AM   Pulmonary Function Test with PULMONARY FUNCTION LAB   PULMONARY LAB OP C (--)    [address] [facility]  [place] [address] [phone]            Mar 03, 2017  9:10 AM   TREVOR [id number] with RBHC MG 3   [facility] [address]    [address]  [place] [address]   "   [phone]           No deodorant, powder, perfume or lotion under the arm or breast area.              Problem List              ICD-10-CM Priority Class Noted - Resolved    Bipolar 2 disorder (CMS-HCC) (Chronic) F31.81   4/30/2010 - Present    Dyslipidemia E78.5   4/30/2010 - Present    Polycythemia D75.1   5/31/2011 - Present    Cigarette nicotine dependence without complication F17.210   1/10/2017 - Present    Chronic obstructive pulmonary disease (CMS-HCC) J44.9   1/23/2017 - Present    UTI (urinary tract infection), uncomplicated N39.0   1/25/2017 - Present      Health Maintenance        Date Due Completion Dates    IMM DTaP/Tdap/Td Vaccine (1 - Tdap) 12/15/1975 ---    MAMMOGRAM 9/18/2014 9/18/2013, 8/23/2012, 2/23/2012, 2/15/2012, 5/27/2010, 5/27/2010    PAP SMEAR 2/15/2015 2/15/2012    IMM ZOSTER VACCINE 12/15/2016 ---    COLONOSCOPY 3/18/2022 3/18/2012 (Done)    Override on 3/18/2012: Done            Current Immunizations     Influenza TIV (IM) 9/10/2016, 10/1/2012, 10/1/2010    Pneumococcal Vaccine (UF)Historical Data 10/1/2010    Pneumococcal polysaccharide vaccine (PPSV-23) 10/1/2010      Below and/or attached are the medications your provider expects you to take. Review all of your home medications and newly ordered medications with your provider and/or pharmacist. Follow medication instructions as directed by your provider and/or pharmacist. Please keep your medication list with you and share with your provider. Update the information when medications are discontinued, doses are changed, or new medications (including over-the-counter products) are added; and carry medication information at all times in the event of emergency situations     Allergies:  CODEINE - (reactions not documented)               Medications  Valid as of: January 25, 2017 -  9:54 AM    Generic Name Brand Name Tablet Size Instructions for use    Atorvastatin Calcium (Tab) LIPITOR 40 MG Take 1 Tab by mouth every day.         Ciprofloxacin HCl (Solution) Ciprofloxacin HCl 0.2 % Place 2 Drops in ear every 6 hours.        Fluticasone Propionate (Suspension) FLONASE 50 MCG/ACT Spray 1 Spray in nose every day.        Sulfamethoxazole-Trimethoprim (Tab) BACTRIM -160 MG Take 1 Tab by mouth 2 Times a Day for 10 days.        .                 Medicines prescribed today were sent to:     [id number] - [place], [place] - [address] AT [facility] [address] [place] [place] [address]    Phone: [phone] Fax: [phone]    Open 24 Hours?: No      Medication refill instructions:       If your prescription bottle indicates you have medication refills left, it is not necessary to call your provider’s office. Please contact your pharmacy and they will refill your medication.    If your prescription bottle indicates you do not have any refills left, you may request refills at any time through one of the following ways: The online Cinemur system (except Urgent Care), by calling your provider’s office, or by asking your pharmacy to contact your provider’s office with a refill request. Medication refills are processed only during regular business hours and may not be available until the next business day. Your provider may request additional information or to have a follow-up visit with you prior to refilling your medication.   *Please Note: Medication refills are assigned a new Rx number when refilled electronically. Your pharmacy may indicate that no refills were authorized even though a new prescription for the same medication is available at the pharmacy. Please request the medicine by name with the pharmacy before contacting your provider for a refill.        Your To Do List     Future Labs/Procedures Complete By Expires    THINPREP PAP WITH HPV  As directed 1/26/2018    URINE CULTURE(NEW)  As directed 1/26/2018    VAGINAL PATHOGENS DNA PANEL  As directed 1/26/2018         Cinemur Access Code: Activation code  not generated  Current MyChart Status: Active

## 2017-01-25 NOTE — ASSESSMENT & PLAN NOTE
Reports two episodes in last year. of dysuria in am and then is symptom free by 1200pm.   She denies hematuria, flank pain, low abd pain, fever, chills  She does have a history of nephrolithiasis in the distant past.

## 2017-01-25 NOTE — PROGRESS NOTES
SUBJECTIVE: 61 y.o. female for annual routine gynecologic exam  Chief Complaint   Patient presents with   • Gynecologic Exam   • Urinary Pain     2 episodes of painful urination in the morning over the last year     UTI (urinary tract infection), uncomplicated  Reports two episodes in last year. of dysuria in am and then is symptom free by 1200pm.   She denies hematuria, flank pain, low abd pain, fever, chills  She does have a history of nephrolithiasis in the distant past.         Obstetric History       T0      TAB0   SAB3   E0   M0   L1       Last Pap: 4-5 years ago  History   Sexual Activity   • Sexual Activity:   • Partners: Male     H/O Abnormal Pap no  She  reports that she has been smoking Cigarettes.  She started smoking about 45 years ago. She has a 46 pack-year smoking history. She has never used smokeless tobacco.        Allergies: Codeine     ROS:    No significant bloating/fluid retention, pelvic pain, or dyspareunia. She does report some mild discharge. States that discharge does not have a foul odor nor is it discolored.   No breast tenderness, she does have a know lipoma at left breast at [facility] and states that this has not changed in size for many years. She has no other breast masses.  No nipple discharge, changes in size or contour, or abnormal cyclic discomfort.  Reports none menopause symptoms of hot flashes, night sweats, sleep disruption, mood changes.Denies vaginal dryness.   No incontinence   No abdominal pain, change in bowel habits, black or bloody stools.    No unusual headaches, no visual changes, menstrual migraines   No prolonged cough. No dyspnea or chest pain on exertion.  No depression, labile mood, anxiety, libido changes, insomnia.  No polydipsia, polyuria, temperature intolerance.  No new/concerning skin lesions, concerns.     Exercise: no regular exercise program  Preventive Care: pt is scheduled for mammogram    Current medicines (including changes  "today)  Current Outpatient Prescriptions   Medication Sig Dispense Refill   • sulfamethoxazole-trimethoprim (BACTRIM DS) 800-160 MG tablet Take 1 Tab by mouth 2 Times a Day for 10 days. 20 Tab 0   • atorvastatin (LIPITOR) 40 MG Tab Take 1 Tab by mouth every day. 90 Tab 3   • fluticasone (FLONASE) 50 MCG/ACT nasal spray Spray 1 Spray in nose every day. 16 g 6   • Ciprofloxacin HCl 0.2 % Solution Place 2 Drops in ear every 6 hours. (Patient not taking: Reported on 1/25/2017) 1 Each 0     No current facility-administered medications for this visit.     She  has a past medical history of Bipolar 2 disorder (CMS-[facility]) (4/30/2010) and HTN (hypertension), benign (4/30/2010). She also has no past medical history of Breast cancer (CMS-HCC).  She  has past surgical history that includes cholecystectomy and primary c section (1978).     Family History:   Family History   Problem Relation Age of Onset   • Cancer Mother      breast   • Cancer Sister      colon   • Heart Disease Maternal Grandmother    • Cancer Father      stomach          OBJECTIVE:   /62 mmHg  Pulse 76  Ht 1.727 m (5' 7.99\")  Wt 65.953 kg (145 lb 6.4 oz)  BMI 22.11 kg/m2  SpO2 95%  Breastfeeding? No  Body mass index is 22.11 kg/(m^2).    HEAD AND NECK:  Ears normal.  Throat, oral cavity and tongue normal.  Neck supple. No adenopathy or masses in the neck or supraclavicular regions.  No carotid bruits. No thyromegaly. NEURO: Cranial nerves are normal. DTR's normal and symmetric.  CHEST:  Clear, good air entry, no wheezes or rales. HEART:  Regular rate and rhythm.  S1 and S2 normal.   No edema or JVD. ABDOMEN:  Soft without tenderness, guarding, mass or organomegaly.  No CVA tenderness or inguinal adenopathy. EXTREMITIES:  Extremities, reflexes and peripheral pulses are normal. SKIN: color normal, vascularity normal, no edema, temperature normal   No rashes or suspicious skin lesions noted.     Breast Exam: Performed with instruction during " examination. No axillary lymphadenopathy, no skin changes, no dominant masses. No nipple retraction  Pelvic Exam -  Normal external genitalia with no lesions. Normal vaginal mucosa with normal rugation and scant discharge. Cervix with no visible lesions. No cervical motion tenderness. Uterus is normal sized with no masses. No adnexal tenderness or enlargement appreciated. Thin Prep Pap is obtained, vaginal swab is obtained and specimen(s) sent to lab      <ASSESSMENT and PLAN>  1. UTI (urinary tract infection), uncomplicated  sulfamethoxazole-trimethoprim (BACTRIM DS) 800-160 MG tablet    Start abx as above  Follow-up after completion of abx for clinic urine dip  PLAN:  Urinalysis   Culture  Follow up if increased signs or symptoms 48 hours with PCP/Urgent Care/ED  Patient states understands agrees with treatment plan and follow up               Discussed  breast self exam, mammography screening, diet and exercise   Follow-up in 1 years for next Gyn exam and 3 years for next Pap.   Next office visit for recheck of chronic medical conditions is due in 3 months

## 2017-01-26 LAB
CYTOLOGY REG CYTOL: NORMAL
HPV HR 12 DNA CVX QL NAA+PROBE: NEGATIVE
HPV16 DNA SPEC QL NAA+PROBE: NEGATIVE
HPV18 DNA SPEC QL NAA+PROBE: NEGATIVE
SPECIMEN SOURCE: NORMAL

## 2017-01-27 ENCOUNTER — TELEPHONE (OUTPATIENT)
Dept: HEMATOLOGY ONCOLOGY | Facility: MEDICAL CENTER | Age: 62
End: 2017-01-27

## 2017-01-27 LAB
BACTERIA UR CULT: NORMAL
CANDIDA DNA VAG QL PROBE+SIG AMP: NEGATIVE
G VAGINALIS DNA VAG QL PROBE+SIG AMP: NEGATIVE
SIGNIFICANT IND 70042: NORMAL
SOURCE SOURCE: NORMAL
T VAGINALIS DNA VAG QL PROBE+SIG AMP: NEGATIVE

## 2017-01-27 NOTE — TELEPHONE ENCOUNTER
Called patient to notify her that RACHNA Ruiz has been made aware that the patient was reassessed by Dr. West and would like to proceed with lung cancer screening. Informed her that RACHNA Ruiz is out of town and when she returns next week she will review to ensure the program will be covered by her insurance.  Patient informed she will be contacted next week by scheduling and she verbalized understanding.

## 2017-02-02 ENCOUNTER — OFFICE VISIT (OUTPATIENT)
Dept: MEDICAL GROUP | Facility: MEDICAL CENTER | Age: 62
End: 2017-02-02
Payer: MEDICARE

## 2017-02-02 VITALS
WEIGHT: 145.8 LBS | HEART RATE: 78 BPM | OXYGEN SATURATION: 93 % | HEIGHT: 68 IN | BODY MASS INDEX: 22.1 KG/M2 | DIASTOLIC BLOOD PRESSURE: 76 MMHG | SYSTOLIC BLOOD PRESSURE: 132 MMHG

## 2017-02-02 DIAGNOSIS — Q60.2 RENAL AGENESIS, UNSPECIFIED: ICD-10-CM

## 2017-02-02 DIAGNOSIS — R31.29 MICROSCOPIC HEMATURIA: ICD-10-CM

## 2017-02-02 PROBLEM — Q51.3 BICORNATE UTERUS: Status: ACTIVE | Noted: 2017-02-02

## 2017-02-02 LAB
APPEARANCE UR: CLEAR
BILIRUB UR STRIP-MCNC: ABNORMAL MG/DL
COLOR UR AUTO: YELLOW
GLUCOSE UR STRIP.AUTO-MCNC: ABNORMAL MG/DL
KETONES UR STRIP.AUTO-MCNC: ABNORMAL MG/DL
LEUKOCYTE ESTERASE UR QL STRIP.AUTO: ABNORMAL
NITRITE UR QL STRIP.AUTO: ABNORMAL
PH UR STRIP.AUTO: 6 [PH] (ref 5–8)
PROT UR QL STRIP: ABNORMAL MG/DL
RBC UR QL AUTO: ABNORMAL
SP GR UR STRIP.AUTO: 1.02
UROBILINOGEN UR STRIP-MCNC: 1 MG/DL

## 2017-02-02 PROCEDURE — G8482 FLU IMMUNIZE ORDER/ADMIN: HCPCS | Performed by: NURSE PRACTITIONER

## 2017-02-02 PROCEDURE — 81002 URINALYSIS NONAUTO W/O SCOPE: CPT | Performed by: NURSE PRACTITIONER

## 2017-02-02 PROCEDURE — G8432 DEP SCR NOT DOC, RNG: HCPCS | Performed by: NURSE PRACTITIONER

## 2017-02-02 PROCEDURE — G8420 CALC BMI NORM PARAMETERS: HCPCS | Performed by: NURSE PRACTITIONER

## 2017-02-02 PROCEDURE — 99213 OFFICE O/P EST LOW 20 MIN: CPT | Performed by: NURSE PRACTITIONER

## 2017-02-02 PROCEDURE — 3017F COLORECTAL CA SCREEN DOC REV: CPT | Performed by: NURSE PRACTITIONER

## 2017-02-02 PROCEDURE — 3014F SCREEN MAMMO DOC REV: CPT | Performed by: NURSE PRACTITIONER

## 2017-02-02 PROCEDURE — 4004F PT TOBACCO SCREEN RCVD TLK: CPT | Performed by: NURSE PRACTITIONER

## 2017-02-02 NOTE — PROGRESS NOTES
Subjective:   No chief complaint on file.    Aimee Guillen is a 61 y.o. female here today for evaluation and management of:    1. Microscopic hematuria  Pt presents today for f/u urine dip-positive for trace blood  She was here for well woman with pap and at the time stated that on two occasions she had experienced dysuria which had resolved by noon. It had been 6 months since she had experienced  these two episodes. She denied that she had any other associated symptoms with the use 2 times of  painful urination. She denied having flank pain, low abdominal pain fever or myalgias.  She has been symptom-free for the last 6-7 months. At her last office visit we did prescribe antibiotic as precaution culture came back negative. Today urine does show trace blood.  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      ROS   Denies any recent fevers or chills. No nausea or vomiting. No diarrhea. No chest pains or shortness of breath. No lower extremity edema.    Allergies   Allergen Reactions   • Codeine        Current medicines (including changes today)  Current Outpatient Prescriptions   Medication Sig Dispense Refill   • sulfamethoxazole-trimethoprim (BACTRIM DS) 800-160 MG tablet Take 1 Tab by mouth 2 Times a Day for 10 days. 20 Tab 0   • atorvastatin (LIPITOR) 40 MG Tab Take 1 Tab by mouth every day. 90 Tab 3   • fluticasone (FLONASE) 50 MCG/ACT nasal spray Spray 1 Spray in nose every day. 16 g 6   • Ciprofloxacin HCl 0.2 % Solution Place 2 Drops in ear every 6 hours. 1 Each 0     No current facility-administered medications for this visit.       Patient Active Problem List    Diagnosis Date Noted   • UTI (urinary tract infection), uncomplicated 01/25/2017   • Chronic obstructive pulmonary disease (CMS-HCC)  "01/23/2017   • Cigarette nicotine dependence without complication 01/10/2017   • Polycythemia 05/31/2011   • Bipolar 2 disorder (CMS-HCC) 04/30/2010   • Dyslipidemia 04/30/2010       Family History   Problem Relation Age of Onset   • Cancer Mother      breast   • Cancer Sister      colon   • Heart Disease Maternal Grandmother    • Cancer Father      stomach          Objective:     Blood pressure 132/76, pulse 78, height 1.727 m (5' 7.99\"), weight 66.134 kg (145 lb 12.8 oz), SpO2 93 %. Body mass index is 22.17 kg/(m^2).     Physical Exam:  Gen: Well developed, well nourished in no acute distress.   Skin: Warm, dry, good turgor, no rashes in visible areas  HEENT: conjunctiva non-injected, sclera non-icteric. EOMs intact.   Lungs: Effort is normal. Clear to auscultation bilaterally with good excursion. No wheeze. No rhonchi.   CV: regular rate and rhythm. No carotid bruits  Abdomen: soft, nontender, + BS. No HSM.  No CVAT  Ext: no edema, color normal, vascularity normal, temperature normal  Alert and oriented Eye contact is good, speech goal directed, affect calm  Psych: Alert and oriented x3, normal affect and mood.      Assessment and Plan:   The following treatment plan was discussed:     1. Microscopic hematuria  REFERRAL TO UROLOGY    Given that she is a smoker with a 46 year pack history and bladder CA is on the differential, will refer to urology for further work-up  She may also have kidney stones that she has passed.         Patient understands and agrees to plan of care.    Followup: with pcp per usual plan of care.     This dictation was created using voice recognition software. The accuracy of the dictation is limited to the abilities of the software. I expect there may be some errors of grammar and possibly content. The MA notes were reviewed and certain aspects of this information were incorporated into this note.                 "

## 2017-02-02 NOTE — MR AVS SNAPSHOT
"Amiee Guillen   2017 10:40 AM   Office Visit   MRN: [id number]    Department:  [address] [place] [address]   Dept Phone:  [phone]    Description:  Female : 1955   Provider:  SAM Amos           Allergies as of 2017     Allergen Noted Reactions    Codeine 2010         You were diagnosed with     Microscopic hematuria   [599.72.ICD-9-CM]         Vital Signs     Blood Pressure Pulse Height Weight Body Mass Index Oxygen Saturation    132/76 mmHg 78 1.727 m (5' 7.99\") 66.134 kg (145 lb 12.8 oz) 22.17 kg/m2 93%    Smoking Status                   Current Every Day Smoker           Basic Information     Date Of Birth Sex Race Ethnicity Preferred Language    1955 Female White Non- English      Your appointments     Mar 03, 2017  9:10 AM   MA SCRN10 with RBHC MG 3   [facility] ([address])    [address] [place] [address] [address]  [facility] [address]   [phone]           No deodorant, powder, perfume or lotion under the arm or breast area.              Problem List              ICD-10-CM Priority Class Noted - Resolved    Bipolar 2 disorder (CMS-HCC) (Chronic) F31.81   2010 - Present    Dyslipidemia E78.5   2010 - Present    Polycythemia D75.1   2011 - Present    Cigarette nicotine dependence without complication F17.210   1/10/2017 - Present    Chronic obstructive pulmonary disease (CMS-[facility]) J44.9   2017 - Present    UTI (urinary tract infection), uncomplicated N39.0   2017 - Present    Bicornate uterus Q51.3   2017 - Present      Health Maintenance        Date Due Completion Dates    IMM DTaP/Tdap/Td Vaccine (1 - Tdap) 12/15/1975 ---    MAMMOGRAM 2014, 2012, 2012, 2/15/2012, 2010, 2010    IMM ZOSTER VACCINE 12/15/2016 ---    PAP SMEAR 2020, 2/15/2012    COLONOSCOPY 3/18/2022 3/18/2012 (Done)    Override on 3/18/2012: Done            Current Immunizations     Influenza TIV " (IM) 9/10/2016, 10/1/2012, 10/1/2010    Pneumococcal Vaccine (UF)Historical Data 10/1/2010    Pneumococcal polysaccharide vaccine (PPSV-23) 10/1/2010      Below and/or attached are the medications your provider expects you to take. Review all of your home medications and newly ordered medications with your provider and/or pharmacist. Follow medication instructions as directed by your provider and/or pharmacist. Please keep your medication list with you and share with your provider. Update the information when medications are discontinued, doses are changed, or new medications (including over-the-counter products) are added; and carry medication information at all times in the event of emergency situations     Allergies:  CODEINE - (reactions not documented)               Medications  Valid as of: February 02, 2017 - 12:32 PM    Generic Name Brand Name Tablet Size Instructions for use    Atorvastatin Calcium (Tab) LIPITOR 40 MG Take 1 Tab by mouth every day.        Ciprofloxacin HCl (Solution) Ciprofloxacin HCl 0.2 % Place 2 Drops in ear every 6 hours.        Fluticasone Propionate (Suspension) FLONASE 50 MCG/ACT Spray 1 Spray in nose every day.        Sulfamethoxazole-Trimethoprim (Tab) BACTRIM -160 MG Take 1 Tab by mouth 2 Times a Day for 10 days.        .                 Medicines prescribed today were sent to:     [facility] [address] [place] [address] [address] AT [facility] [address] [facility] [address]    Phone: [phone] Fax: [phone]    Open 24 Hours?: No      Medication refill instructions:       If your prescription bottle indicates you have medication refills left, it is not necessary to call your provider’s office. Please contact your pharmacy and they will refill your medication.    If your prescription bottle indicates you do not have any refills left, you may request refills at any time through one of the following ways: The online [employer] system (except  Urgent Care), by calling your provider’s office, or by asking your pharmacy to contact your provider’s office with a refill request. Medication refills are processed only during regular business hours and may not be available until the next business day. Your provider may request additional information or to have a follow-up visit with you prior to refilling your medication.   *Please Note: Medication refills are assigned a new Rx number when refilled electronically. Your pharmacy may indicate that no refills were authorized even though a new prescription for the same medication is available at the pharmacy. Please request the medicine by name with the pharmacy before contacting your provider for a refill.        Referral     A referral request has been sent to our patient care coordination department. Please allow 3-5 business days for us to process this request and contact you either by phone or mail. If you do not hear from us by the 5th business day, please call us at [phone].           [employer] Access Code: Activation code not generated  Current [employer] Status: Active

## 2017-02-06 DIAGNOSIS — F17.210 CIGARETTE NICOTINE DEPENDENCE WITHOUT COMPLICATION: ICD-10-CM

## 2017-02-15 ENCOUNTER — HOSPITAL ENCOUNTER (OUTPATIENT)
Dept: RADIOLOGY | Facility: MEDICAL CENTER | Age: 62
End: 2017-02-15
Attending: NURSE PRACTITIONER
Payer: MEDICARE

## 2017-02-15 DIAGNOSIS — F17.210 CIGARETTE NICOTINE DEPENDENCE WITHOUT COMPLICATION: ICD-10-CM

## 2017-02-15 PROCEDURE — G0297 LDCT FOR LUNG CA SCREEN: HCPCS

## 2017-02-22 ENCOUNTER — HOSPITAL ENCOUNTER (OUTPATIENT)
Dept: RADIOLOGY | Facility: MEDICAL CENTER | Age: 62
End: 2017-02-22
Attending: PHYSICIAN ASSISTANT
Payer: MEDICARE

## 2017-02-22 DIAGNOSIS — R31.29 OTHER MICROSCOPIC HEMATURIA: ICD-10-CM

## 2017-02-22 PROCEDURE — 700117 HCHG RX CONTRAST REV CODE 255: Performed by: PHYSICIAN ASSISTANT

## 2017-02-22 PROCEDURE — 74178 CT ABD&PLV WO CNTR FLWD CNTR: CPT

## 2017-02-22 RX ADMIN — IOHEXOL 100 ML: 350 INJECTION, SOLUTION INTRAVENOUS at 09:45

## 2017-03-01 ENCOUNTER — TELEPHONE (OUTPATIENT)
Dept: HEMATOLOGY ONCOLOGY | Facility: MEDICAL CENTER | Age: 62
End: 2017-03-01

## 2017-03-01 NOTE — TELEPHONE ENCOUNTER
Called patient with lung cancer screening results following review with RACHNA Ruiz. Pt states she has seen her results on my chart.  Educated patient the results showed no suspicious lung nodules and no concern for malignancy. Informed her the results showed a single tiny 2mm nodule in posterior left upper lobe that has a benign or non-cancerous appearance. Recommended to continue annual screening with LDCT in 12 months. Patient verbalized understanding. Patient sent result letter and health maintenance updated for annual screening. Pt states she has follow-up apt with Dr. West next week and will review the results with him as well.

## 2017-03-03 ENCOUNTER — HOSPITAL ENCOUNTER (OUTPATIENT)
Dept: RADIOLOGY | Facility: MEDICAL CENTER | Age: 62
End: 2017-03-03
Attending: INTERNAL MEDICINE
Payer: MEDICARE

## 2017-03-03 DIAGNOSIS — Z12.31 VISIT FOR SCREENING MAMMOGRAM: ICD-10-CM

## 2017-03-08 ENCOUNTER — OFFICE VISIT (OUTPATIENT)
Dept: MEDICAL GROUP | Facility: MEDICAL CENTER | Age: 62
End: 2017-03-08
Payer: MEDICARE

## 2017-03-08 VITALS
RESPIRATION RATE: 16 BRPM | SYSTOLIC BLOOD PRESSURE: 124 MMHG | WEIGHT: 147 LBS | HEIGHT: 68 IN | TEMPERATURE: 97.9 F | OXYGEN SATURATION: 89 % | HEART RATE: 74 BPM | DIASTOLIC BLOOD PRESSURE: 72 MMHG | BODY MASS INDEX: 22.28 KG/M2

## 2017-03-08 DIAGNOSIS — J44.9 CHRONIC OBSTRUCTIVE PULMONARY DISEASE, UNSPECIFIED COPD TYPE (HCC): ICD-10-CM

## 2017-03-08 DIAGNOSIS — D75.1 POLYCYTHEMIA: ICD-10-CM

## 2017-03-08 DIAGNOSIS — F17.210 CIGARETTE NICOTINE DEPENDENCE WITHOUT COMPLICATION: ICD-10-CM

## 2017-03-08 DIAGNOSIS — R09.02 HYPOXEMIA: ICD-10-CM

## 2017-03-08 PROCEDURE — 3014F SCREEN MAMMO DOC REV: CPT | Performed by: INTERNAL MEDICINE

## 2017-03-08 PROCEDURE — 4004F PT TOBACCO SCREEN RCVD TLK: CPT | Performed by: INTERNAL MEDICINE

## 2017-03-08 PROCEDURE — G8432 DEP SCR NOT DOC, RNG: HCPCS | Performed by: INTERNAL MEDICINE

## 2017-03-08 PROCEDURE — G8482 FLU IMMUNIZE ORDER/ADMIN: HCPCS | Performed by: INTERNAL MEDICINE

## 2017-03-08 PROCEDURE — G8420 CALC BMI NORM PARAMETERS: HCPCS | Performed by: INTERNAL MEDICINE

## 2017-03-08 PROCEDURE — 99406 BEHAV CHNG SMOKING 3-10 MIN: CPT | Performed by: INTERNAL MEDICINE

## 2017-03-08 PROCEDURE — 99214 OFFICE O/P EST MOD 30 MIN: CPT | Mod: 25 | Performed by: INTERNAL MEDICINE

## 2017-03-08 PROCEDURE — 3017F COLORECTAL CA SCREEN DOC REV: CPT | Performed by: INTERNAL MEDICINE

## 2017-03-08 ASSESSMENT — PATIENT HEALTH QUESTIONNAIRE - PHQ9: CLINICAL INTERPRETATION OF PHQ2 SCORE: 0

## 2017-03-08 NOTE — MR AVS SNAPSHOT
"        Aimee Guillen   3/8/2017 10:20 AM   Office Visit   MRN: [id number]    Department:  [address] [place] [address]   Dept Phone:  [phone]    Description:  Female : 1955   Provider:  Pepe West M.D.           Reason for Visit     Results CT results      Allergies as of 3/8/2017     Allergen Noted Reactions    Codeine 2010         You were diagnosed with     Chronic obstructive pulmonary disease, unspecified COPD type (CMS-HCC)   [0251755]       Polycythemia   [982799]       Cigarette nicotine dependence without complication   [743299]       Hypoxemia   [799.02.ICD-9-CM]         Vital Signs     Blood Pressure Pulse Temperature Respirations Height Weight    124/72 mmHg 74 36.6 °C (97.9 °F) 16 1.727 m (5' 8\") 66.679 kg (147 lb)    Body Mass Index Oxygen Saturation Smoking Status             22.36 kg/m2 89% Current Every Day Smoker         Basic Information     Date Of Birth Sex Race Ethnicity Preferred Language    1955 Female White Non- English      Your appointments     Mar 22, 2017  9:00 AM   MA DX30 with S DENNIS MG 1   [facility] ([place])    [address]  [place] [address] [phone]              Problem List              ICD-10-CM Priority Class Noted - Resolved    Bipolar 2 disorder (CMS-HCC) (Chronic) F31.81   2010 - Present    Dyslipidemia E78.5   2010 - Present    Polycythemia D75.1   2011 - Present    Cigarette nicotine dependence without complication F17.210   1/10/2017 - Present    Chronic obstructive pulmonary disease (CMS-HCC) J44.9   2017 - Present    UTI (urinary tract infection), uncomplicated N39.0   2017 - Present    Bicornate uterus Q51.3   2017 - Present    Renal agenesis, unspecified Q60.2   2017 - Present      Health Maintenance        Date Due Completion Dates    IMM DTaP/Tdap/Td Vaccine (1 - Tdap) 12/15/1975 ---    MAMMOGRAM 2014, 2012, " 2/23/2012, 2/15/2012, 5/27/2010, 5/27/2010    IMM ZOSTER VACCINE 12/15/2016 ---    PAP SMEAR 1/25/2020 1/25/2017, 2/15/2012    COLONOSCOPY 3/18/2022 3/18/2012 (Done)    Override on 3/18/2012: Done            Current Immunizations     Influenza TIV (IM) 9/10/2016, 10/1/2012, 10/1/2010    Pneumococcal Vaccine (UF)Historical Data 10/1/2010    Pneumococcal polysaccharide vaccine (PPSV-23) 10/1/2010      Below and/or attached are the medications your provider expects you to take. Review all of your home medications and newly ordered medications with your provider and/or pharmacist. Follow medication instructions as directed by your provider and/or pharmacist. Please keep your medication list with you and share with your provider. Update the information when medications are discontinued, doses are changed, or new medications (including over-the-counter products) are added; and carry medication information at all times in the event of emergency situations     Allergies:  CODEINE - (reactions not documented)               Medications  Valid as of: March 08, 2017 - 10:41 AM    Generic Name Brand Name Tablet Size Instructions for use    Atorvastatin Calcium (Tab) LIPITOR 40 MG Take 1 Tab by mouth every day.        Ciprofloxacin HCl (Solution) Ciprofloxacin HCl 0.2 % Place 2 Drops in ear every 6 hours.        Fluticasone Propionate (Suspension) FLONASE 50 MCG/ACT Spray 1 Spray in nose every day.        .                 Medicines prescribed today were sent to:     [facility] [address] [place] [address] [address] AT [facility] [address] [place] [address]    Phone: [phone] Fax: [phone]    Open 24 Hours?: No      Medication refill instructions:       If your prescription bottle indicates you have medication refills left, it is not necessary to call your provider’s office. Please contact your pharmacy and they will refill your medication.    If your prescription bottle indicates you do  not have any refills left, you may request refills at any time through one of the following ways: The online [employer] system (except Urgent Care), by calling your provider’s office, or by asking your pharmacy to contact your provider’s office with a refill request. Medication refills are processed only during regular business hours and may not be available until the next business day. Your provider may request additional information or to have a follow-up visit with you prior to refilling your medication.   *Please Note: Medication refills are assigned a new Rx number when refilled electronically. Your pharmacy may indicate that no refills were authorized even though a new prescription for the same medication is available at the pharmacy. Please request the medicine by name with the pharmacy before contacting your provider for a refill.        Referral     A referral request has been sent to our patient care coordination department. Please allow 3-5 business days for us to process this request and contact you either by phone or mail. If you do not hear from us by the 5th business day, please call us at [phone].           [employer] Access Code: Activation code not generated  Current [employer] Status: Active          Quit Tobacco Information     Do you want to quit using tobacco?    Quitting tobacco decreases risks of cancer, heart and lung disease, increases life expectancy, improves sense of taste and smell, and increases spending money, among other benefits.    If you are thinking about quitting, we can help.  • [facility] Quit Tobacco Program: 637.241.7672  o Program occurs weekly for four weeks and includes pharmacist consultation on products to support quitting smoking or chewing tobacco. A provider referral is needed for pharmacist consultation.  • Tobacco Users Help Hotline: 8-800-QUIT-NOW (836-4101) or https://[place].quitlogix.org/  o Free, confidential telephone and online coaching for [place] residents.  Sessions are designed on a schedule that is convenient for you. Eligible clients receive free nicotine replacement therapy.  • Nationally: www.smokefree.gov  o Information and professional assistance to support both immediate and long-term needs as you become, and remain, a non-smoker. Smokefree.gov allows you to choose the help that best fits your needs.

## 2017-03-08 NOTE — PROGRESS NOTES
"CC: Follow-up CT.    HPI:   Aimee presents today with the following.    1. Chronic obstructive pulmonary disease, unspecified COPD type (CMS-HCC)  Presents with a long history of polycythemia and lowest oxygen saturations. She did have a CT of the long showing some groundglass and a very small nodule but nothing else concerning. She does continue to smoke. Oxygen is low in office today at 89 but does not qualify and she's refused oxygen in the past. She is planning on quitting smoking but has not been able to do so. She has pulmonary function tests upcoming. She denies any chest pains or palpitations.    2. Polycythemia  As mentioned above.    3. Cigarette nicotine dependence without complication  Planning on trying to quit.    4. Hypoxemia  Still declines nocturnal oxygen she is near qualification at rest.      Patient Active Problem List    Diagnosis Date Noted   • Bicornate uterus 02/02/2017   • Renal agenesis, unspecified 02/02/2017   • UTI (urinary tract infection), uncomplicated 01/25/2017   • Chronic obstructive pulmonary disease (CMS-HCC) 01/23/2017   • Cigarette nicotine dependence without complication 01/10/2017   • Polycythemia 05/31/2011   • Bipolar 2 disorder (CMS-HCC) 04/30/2010   • Dyslipidemia 04/30/2010       Current Outpatient Prescriptions   Medication Sig Dispense Refill   • atorvastatin (LIPITOR) 40 MG Tab Take 1 Tab by mouth every day. 90 Tab 3   • fluticasone (FLONASE) 50 MCG/ACT nasal spray Spray 1 Spray in nose every day. 16 g 6   • Ciprofloxacin HCl 0.2 % Solution Place 2 Drops in ear every 6 hours. 1 Each 0     No current facility-administered medications for this visit.         Allergies as of 03/08/2017 - Eddie as Reviewed 03/08/2017   Allergen Reaction Noted   • Codeine  05/20/2010        ROS: As per HPI.    /72 mmHg  Pulse 74  Temp(Src) 36.6 °C (97.9 °F)  Resp 16  Ht 1.727 m (5' 8\")  Wt 66.679 kg (147 lb)  BMI 22.36 kg/m2  SpO2 89%    Physical Exam:  Gen:         Alert " and oriented, No apparent distress.  Neck:        No Lymphadenopathy or Bruits.  Lungs:     Clear to auscultation bilaterally  CV:          Regular rate and rhythm. No murmurs, rubs or gallops.               Ext:          No clubbing, cyanosis, edema.      Assessment and Plan.   61 y.o. female with the following issues.    1. Chronic obstructive pulmonary disease, unspecified COPD type (CMS-[facility])  She is on the verge of being able to accept that she's got underlying lung issues. Will await pulmonary function tests and likely start on inhalers. Also refer to pulmonologist for evaluation.  - REFERRAL TO PULMONOLOGY    2. Polycythemia  Discussion again about oxygen which she declines.  - REFERRAL TO PULMONOLOGY    3. Cigarette nicotine dependence without complication  Strong recommendations for cessation.  Over 3 minutes spent on counseling  - REFERRAL TO PULMONOLOGY    4. Hypoxemia  Again declining oxygen.  - REFERRAL TO PULMONOLOGY

## 2017-03-16 ENCOUNTER — HOSPITAL ENCOUNTER (OUTPATIENT)
Dept: RADIOLOGY | Facility: MEDICAL CENTER | Age: 62
End: 2017-03-16

## 2017-03-22 ENCOUNTER — HOSPITAL ENCOUNTER (OUTPATIENT)
Dept: RADIOLOGY | Facility: MEDICAL CENTER | Age: 62
End: 2017-03-22
Attending: INTERNAL MEDICINE
Payer: MEDICARE

## 2017-03-22 DIAGNOSIS — N63.20 LEFT BREAST LUMP: ICD-10-CM

## 2017-03-22 DIAGNOSIS — N64.4 BREAST PAIN, LEFT: ICD-10-CM

## 2017-03-22 PROCEDURE — G0204 DX MAMMO INCL CAD BI: HCPCS

## 2017-03-22 PROCEDURE — 76642 ULTRASOUND BREAST LIMITED: CPT | Mod: LT

## 2017-05-30 ENCOUNTER — TELEPHONE (OUTPATIENT)
Dept: PULMONOLOGY | Facility: HOSPICE | Age: 62
End: 2017-05-30

## 2017-05-30 DIAGNOSIS — R06.00 DYSPNEA, UNSPECIFIED TYPE: ICD-10-CM

## 2017-05-31 PROBLEM — D22.5 MELANOCYTIC NEVI OF TRUNK: Status: ACTIVE | Noted: 2017-05-31

## 2017-05-31 PROBLEM — D22.72 MELANOCYTIC NEVI OF LEFT LOWER LIMB, INCLUDING HIP: Status: ACTIVE | Noted: 2017-05-31

## 2017-05-31 PROBLEM — Z85.820 PERSONAL HISTORY OF MALIGNANT MELANOMA OF SKIN: Status: ACTIVE | Noted: 2017-05-31

## 2017-06-14 PROBLEM — D49.2 NEOPLASM OF UNSPECIFIED BEHAVIOR OF BONE, SOFT TISSUE, AND SKIN: Status: RESOLVED | Noted: 2017-05-31 | Resolved: 2017-06-14

## 2017-06-21 ENCOUNTER — APPOINTMENT (OUTPATIENT)
Dept: PULMONOLOGY | Facility: HOSPICE | Age: 62
End: 2017-06-21
Payer: MEDICARE

## 2017-08-15 ENCOUNTER — OFFICE VISIT (OUTPATIENT)
Dept: URGENT CARE | Facility: CLINIC | Age: 62
End: 2017-08-15
Payer: MEDICARE

## 2017-08-15 ENCOUNTER — APPOINTMENT (OUTPATIENT)
Dept: RADIOLOGY | Facility: IMAGING CENTER | Age: 62
End: 2017-08-15
Attending: PHYSICIAN ASSISTANT
Payer: MEDICARE

## 2017-08-15 VITALS
DIASTOLIC BLOOD PRESSURE: 80 MMHG | SYSTOLIC BLOOD PRESSURE: 110 MMHG | RESPIRATION RATE: 16 BRPM | OXYGEN SATURATION: 95 % | HEART RATE: 82 BPM | TEMPERATURE: 99 F | HEIGHT: 68 IN | BODY MASS INDEX: 21.98 KG/M2 | WEIGHT: 145 LBS

## 2017-08-15 DIAGNOSIS — R07.81 RIB PAIN ON LEFT SIDE: ICD-10-CM

## 2017-08-15 DIAGNOSIS — F17.200 SMOKER: ICD-10-CM

## 2017-08-15 PROCEDURE — 71101 X-RAY EXAM UNILAT RIBS/CHEST: CPT | Mod: 26,LT | Performed by: PHYSICIAN ASSISTANT

## 2017-08-15 PROCEDURE — 99214 OFFICE O/P EST MOD 30 MIN: CPT | Performed by: PHYSICIAN ASSISTANT

## 2017-08-15 RX ORDER — IBUPROFEN 200 MG
200 TABLET ORAL EVERY 6 HOURS PRN
COMMUNITY

## 2017-08-15 ASSESSMENT — ENCOUNTER SYMPTOMS
FEVER: 0
ABDOMINAL PAIN: 0
EYE DISCHARGE: 0
MYALGIAS: 0
NECK PAIN: 0
CHILLS: 0
HEADACHES: 0
VISUAL CHANGE: 0
JOINT SWELLING: 0
EYE REDNESS: 0
WHEEZING: 0
SORE THROAT: 0
VOMITING: 0
SPUTUM PRODUCTION: 0
FALLS: 0
DIARRHEA: 0
COUGH: 0
CHANGE IN BOWEL HABIT: 0

## 2017-08-15 NOTE — PROGRESS NOTES
"Subjective:      Aimee Guillen is a 61 y.o. female who presents with Rib Injury          Pt is 60 y/o female who presents with left lower rib pain after bending over in the bathtub 5 days ago. Pt. Notes pain to her left lower ribs after feeling a pop- she notes that she felt a rib/ribs go anteriorly and she is worried \"about it going back\" and poking a lung. She denies any other trauma. She also denies any new SOB. She does smoke and has hx of smokers cough- but denies any new cough.   Rib Injury  This is a new problem. Episode onset: 5 days ago. The problem occurs constantly. The problem has been waxing and waning. Pertinent negatives include no abdominal pain, change in bowel habit, chest pain, chills, congestion, coughing, fever, headaches, joint swelling, myalgias, neck pain, rash, sore throat, visual change or vomiting. Exacerbated by: Breathing deep or pressure on the site.  She has tried NSAIDs for the symptoms. The treatment provided significant relief.       Review of Systems   Constitutional: Negative for fever, chills and malaise/fatigue.   HENT: Negative for congestion, ear discharge, ear pain and sore throat.    Eyes: Negative for discharge and redness.   Respiratory: Negative for cough, sputum production and wheezing.         POs for left sided rib pain.    Cardiovascular: Negative for chest pain and leg swelling.   Gastrointestinal: Negative for vomiting, abdominal pain, diarrhea and change in bowel habit.   Genitourinary: Negative for dysuria and urgency.   Musculoskeletal: Negative for myalgias, falls, joint swelling and neck pain.   Skin: Negative for itching and rash.   Neurological: Negative for headaches.          Objective:     /80 mmHg  Pulse 82  Temp(Src) 37.2 °C (99 °F)  Resp 16  Ht 1.727 m (5' 7.99\")  Wt 65.772 kg (145 lb)  BMI 22.05 kg/m2  SpO2 95%   PMH:  has a past medical history of Bipolar 2 disorder (CMS-HCC) (4/30/2010) and HTN (hypertension), benign (4/30/2010). " She also has no past medical history of Breast cancer (CMS-[facility]).  MEDS:   Current outpatient prescriptions:   •  ibuprofen (MOTRIN) 200 MG Tab, Take 200 mg by mouth every 6 hours as needed., Disp: , Rfl:   •  atorvastatin (LIPITOR) 40 MG Tab, Take 1 Tab by mouth every day., Disp: 90 Tab, Rfl: 3  •  fluticasone (FLONASE) 50 MCG/ACT nasal spray, Spray 1 Spray in nose every day., Disp: 16 g, Rfl: 6  •  Ciprofloxacin HCl 0.2 % Solution, Place 2 Drops in ear every 6 hours., Disp: 1 Each, Rfl: 0  ALLERGIES:   Allergies   Allergen Reactions   • Codeine      SURGHX:   Past Surgical History   Procedure Laterality Date   • Cholecystectomy     • Primary c section  1978     SOCHX:  reports that she has been smoking Cigarettes.  She started smoking about 45 years ago. She has a 46 pack-year smoking history. She has never used smokeless tobacco. She reports that she does not drink alcohol or use illicit drugs.  FH: Family history was reviewed, no pertinent findings to report    Physical Exam   Constitutional: She is oriented to person, place, and time. She appears well-developed and well-nourished. No distress.   HENT:   Head: Normocephalic and atraumatic.   Mouth/Throat: No oropharyngeal exudate.   Eyes: Conjunctivae and EOM are normal. Pupils are equal, round, and reactive to light.   Neck: Normal range of motion. Neck supple. No tracheal deviation present.   Cardiovascular: Normal rate and regular rhythm.    No murmur heard.  Pulmonary/Chest: Effort normal and breath sounds normal. No respiratory distress. Chest wall is not dull to percussion. She exhibits tenderness and bony tenderness. She exhibits no mass, no crepitus, no edema and no swelling.       Noted tenderness along the edge of the anterior lower ribs. Without noted crepitus or noted deformity. Without noted stepoff. Lung- CTA and symmetrical.    Musculoskeletal: Normal range of motion. She exhibits no edema.   Neurological: She is alert and oriented to person,  place, and time. Coordination normal.   Skin: Skin is warm. No rash noted.   Psychiatric: She has a normal mood and affect. Her behavior is normal. Judgment and thought content normal.   Vitals reviewed.           Xr rib:   No fractures or acute bony changes are noted.  There is no evidence of a hemo or pneumothorax.  Radiologist read    Assessment/Plan:     1. Rib pain on left side  - [id number] (WITH 1-VIEW CXR) LEFT; Future    2. Smoker    At this time without any bony changes- encouraged continue use of NSAIDs, along with ice/heat if needed. Discussed complications of rib pain- discussed s/s of what to return to clinic for.     Smoking cessation was discussed today for longer than 3 min. OTC Smoking cessation aids were discussed and pt. will consider such, however is not ready to quit at this time.     Patient given precautionary s/sx that mandate immediate follow up and evaluation in the ED. Advised of risks of not doing so.    DDX, Supportive care, and indications for immediate follow-up discussed with patient.    Instructed to return to clinic or nearest emergency department if we are not available for any change in condition, further concerns, or worsening of symptoms.    The patient demonstrated a good understanding and agreed with the treatment plan.

## 2017-08-15 NOTE — MR AVS SNAPSHOT
"        Aimee SANTIAGO Mindy   8/15/2017 8:15 AM   Office Visit   MRN: [id number]    Department:  [facility]   Dept Phone:  [phone]    Description:  Female : 1955   Provider:  Gagandeep Swartz PA-C           Reason for Visit     Rib Injury x 5 days/ bending over on bath tub/ left side of rib made pop sound      Allergies as of 8/15/2017     Allergen Noted Reactions    Codeine 2010         You were diagnosed with     Rib pain on left side   [[id number]]       Smoker   [[id number]]         Vital Signs     Blood Pressure Pulse Temperature Respirations Height Weight    110/80 mmHg 82 37.2 °C (99 °F) 16 1.727 m (5' 7.99\") 65.772 kg (145 lb)    Body Mass Index Oxygen Saturation Smoking Status             22.05 kg/m2 95% Current Every Day Smoker         Basic Information     Date Of Birth Sex Race Ethnicity Preferred Language    1955 Female White Non- English      Problem List              ICD-10-CM Priority Class Noted - Resolved    Bipolar 2 disorder (CMS-HCC) (Chronic) F31.81   2010 - Present    Dyslipidemia E78.5   2010 - Present    Polycythemia D75.1   2011 - Present    Cigarette nicotine dependence without complication F17.210   1/10/2017 - Present    Chronic obstructive pulmonary disease (CMS-HCC) J44.9   2017 - Present    UTI (urinary tract infection), uncomplicated N39.0   2017 - Present    Bicornate uterus Q51.3   2017 - Present    Renal agenesis, unspecified Q60.2   2017 - Present      Health Maintenance        Date Due Completion Dates    IMM DTaP/Tdap/Td Vaccine (1 - Tdap) 12/15/1975 ---    IMM ZOSTER VACCINE 12/15/2016 ---    IMM INFLUENZA (1) 2017 9/10/2016, 10/1/2012, 10/1/2010    MAMMOGRAM 3/22/2018 3/22/2017, 2013, 2012, 2012, 2/15/2012, 2010, 2010    PAP SMEAR 2020, 2/15/2012    COLONOSCOPY 3/18/2022 3/18/2012 (Done)    Override on 3/18/2012: Done            Current Immunizations     Influenza TIV " (IM) 9/10/2016, 10/1/2012, 10/1/2010    Pneumococcal Vaccine (UF)Historical Data 10/1/2010    Pneumococcal polysaccharide vaccine (PPSV-23) 10/1/2010      Below and/or attached are the medications your provider expects you to take. Review all of your home medications and newly ordered medications with your provider and/or pharmacist. Follow medication instructions as directed by your provider and/or pharmacist. Please keep your medication list with you and share with your provider. Update the information when medications are discontinued, doses are changed, or new medications (including over-the-counter products) are added; and carry medication information at all times in the event of emergency situations     Allergies:  CODEINE - (reactions not documented)               Medications  Valid as of: August 15, 2017 -  9:58 AM    Generic Name Brand Name Tablet Size Instructions for use    Atorvastatin Calcium (Tab) LIPITOR 40 MG Take 1 Tab by mouth every day.        Ciprofloxacin HCl (Solution) Ciprofloxacin HCl 0.2 % Place 2 Drops in ear every 6 hours.        Fluticasone Propionate (Suspension) FLONASE 50 MCG/ACT Spray 1 Spray in nose every day.        Ibuprofen (Tab) MOTRIN 200 MG Take 200 mg by mouth every 6 hours as needed.        .                 Medicines prescribed today were sent to:     [id number]  [place], [place] - [address] AT [facility] [address] [place] [address]    Phone: [phone] Fax: [phone]    Open 24 Hours?: No      Medication refill instructions:       If your prescription bottle indicates you have medication refills left, it is not necessary to call your provider’s office. Please contact your pharmacy and they will refill your medication.    If your prescription bottle indicates you do not have any refills left, you may request refills at any time through one of the following ways: The online [employer] system (except Urgent Care), by calling your  provider’s office, or by asking your pharmacy to contact your provider’s office with a refill request. Medication refills are processed only during regular business hours and may not be available until the next business day. Your provider may request additional information or to have a follow-up visit with you prior to refilling your medication.   *Please Note: Medication refills are assigned a new Rx number when refilled electronically. Your pharmacy may indicate that no refills were authorized even though a new prescription for the same medication is available at the pharmacy. Please request the medicine by name with the pharmacy before contacting your provider for a refill.        Your To Do List     Future Labs/Procedures Complete By Expires    [id number] (WITH 1-VIEW CXR) LEFT  As directed 8/15/2018         [employer] Access Code: Activation code not generated  Current [employer] Status: Active          Quit Tobacco Information     Do you want to quit using tobacco?    Quitting tobacco decreases risks of cancer, heart and lung disease, increases life expectancy, improves sense of taste and smell, and increases spending money, among other benefits.    If you are thinking about quitting, we can help.  • [facility] Quit Tobacco Program: [phone]  o Program occurs weekly for four weeks and includes pharmacist consultation on products to support quitting smoking or chewing tobacco. A provider referral is needed for pharmacist consultation.  • Tobacco Users Help Hotline: 6-785-QUITNOW (476-6391) or https://[place].quitlogix.org/  o Free, confidential telephone and online coaching for [place] residents. Sessions are designed on a schedule that is convenient for you. Eligible clients receive free nicotine replacement therapy.  • Nationally: www.smokefree.gov  o Information and professional assistance to support both immediate and long-term needs as you become, and remain, a non-smoker. Smokefree.gov allows you to  choose the help that best fits your needs.

## 2017-10-03 ENCOUNTER — TELEPHONE (OUTPATIENT)
Dept: MEDICAL GROUP | Facility: MEDICAL CENTER | Age: 62
End: 2017-10-03

## 2017-10-03 DIAGNOSIS — R92.8 ABNORMAL MAMMOGRAM: ICD-10-CM

## 2017-10-03 NOTE — TELEPHONE ENCOUNTER
VOICEMAIL  1. Caller Name: Aimee                      Call Back Number: [phone]    2. Message: Patient needs an order for a diagnostic mammogram to follow up from her mammogram 6 months ago    3. Patient approves office to leave a detailed voicemail/MyChart message: N\A

## 2017-10-26 ENCOUNTER — PATIENT OUTREACH (OUTPATIENT)
Dept: HEALTH INFORMATION MANAGEMENT | Facility: OTHER | Age: 62
End: 2017-10-26

## 2017-10-26 ENCOUNTER — TELEPHONE (OUTPATIENT)
Dept: RADIOLOGY | Facility: MEDICAL CENTER | Age: 62
End: 2017-10-26

## 2017-10-30 DIAGNOSIS — Z23 NEED FOR INFLUENZA VACCINATION: ICD-10-CM

## 2017-10-30 NOTE — PROGRESS NOTES
2. SPECIFIC Action To Be Taken: Orders pending, please sign.    3. Diagnosis/Clinical Reason for Request: flu shot    4. Specialty & Provider Name/Lab/Imaging Location: in office    5. Is appointment scheduled for requested order/referral: yes - today

## 2017-11-02 ENCOUNTER — TELEPHONE (OUTPATIENT)
Dept: RADIOLOGY | Facility: MEDICAL CENTER | Age: 62
End: 2017-11-02

## 2017-11-03 ENCOUNTER — HOSPITAL ENCOUNTER (OUTPATIENT)
Dept: RADIOLOGY | Facility: MEDICAL CENTER | Age: 62
End: 2017-11-03
Attending: INTERNAL MEDICINE
Payer: MEDICARE

## 2017-11-03 DIAGNOSIS — R92.8 ABNORMAL MAMMOGRAM: ICD-10-CM

## 2017-11-03 PROCEDURE — 76642 ULTRASOUND BREAST LIMITED: CPT | Mod: LT

## 2017-11-17 ENCOUNTER — HOSPITAL ENCOUNTER (OUTPATIENT)
Dept: RADIOLOGY | Facility: MEDICAL CENTER | Age: 62
End: 2017-11-17
Attending: INTERNAL MEDICINE
Payer: MEDICARE

## 2017-11-17 DIAGNOSIS — N60.02 BREAST CYST, LEFT: ICD-10-CM

## 2017-11-17 PROCEDURE — 19083 BX BREAST 1ST LESION US IMAG: CPT

## 2017-11-17 PROCEDURE — 88305 TISSUE EXAM BY PATHOLOGIST: CPT

## 2017-11-20 ENCOUNTER — OFFICE VISIT (OUTPATIENT)
Dept: URGENT CARE | Facility: CLINIC | Age: 62
End: 2017-11-20
Payer: MEDICARE

## 2017-11-20 ENCOUNTER — TELEPHONE (OUTPATIENT)
Dept: RADIOLOGY | Facility: MEDICAL CENTER | Age: 62
End: 2017-11-20

## 2017-11-20 VITALS
TEMPERATURE: 98.5 F | OXYGEN SATURATION: 96 % | BODY MASS INDEX: 21.6 KG/M2 | HEART RATE: 85 BPM | WEIGHT: 142 LBS | DIASTOLIC BLOOD PRESSURE: 64 MMHG | RESPIRATION RATE: 14 BRPM | SYSTOLIC BLOOD PRESSURE: 112 MMHG

## 2017-11-20 DIAGNOSIS — B85.2 LICE INFESTATION: ICD-10-CM

## 2017-11-20 DIAGNOSIS — L29.9 ITCHING: ICD-10-CM

## 2017-11-20 PROCEDURE — 99203 OFFICE O/P NEW LOW 30 MIN: CPT | Performed by: EMERGENCY MEDICINE

## 2017-11-20 RX ORDER — IVERMECTIN 3 MG/1
150 TABLET ORAL ONCE
Qty: 4 TAB | Refills: 0 | Status: SHIPPED | OUTPATIENT
Start: 2017-11-20 | End: 2017-11-20

## 2017-11-20 RX ORDER — HYDROXYZINE HYDROCHLORIDE 25 MG/1
25-50 TABLET, FILM COATED ORAL 3 TIMES DAILY PRN
Qty: 30 TAB | Refills: 0 | Status: SHIPPED | OUTPATIENT
Start: 2017-11-20 | End: 2017-12-05 | Stop reason: SDUPTHER

## 2017-11-20 ASSESSMENT — ENCOUNTER SYMPTOMS
HALLUCINATIONS: 0
DEPRESSION: 0
DIARRHEA: 0
FEVER: 0
VOMITING: 0
NERVOUS/ANXIOUS: 1
SHORTNESS OF BREATH: 0

## 2017-11-20 NOTE — PROGRESS NOTES
Subjective:      Aimee Guillen is a 61 y.o. female who presents with Head Lice (x 2 weeks with bed bugs pt states she started noticing at home)            Head Lice   This is a new problem. Episode onset: 1 week. The problem occurs daily. The problem has been unchanged. Associated symptoms include a rash. Pertinent negatives include no congestion, fever or vomiting. Nothing aggravates the symptoms. Treatments tried: RID. The treatment provided mild relief.   Rash   This is a new problem. Episode onset: over 2 weeks. The problem has been waxing and waning since onset. The affected locations include the torso, left arm, right arm, left lower leg and right lower leg. The rash is characterized by itchiness. She was exposed to an insect bite/sting. Pertinent negatives include no congestion, diarrhea, fever, joint pain, shortness of breath or vomiting. Treatments tried: topical antiparasitic. The treatment provided no relief.   States visible nits on hair combing.    Review of Systems   Constitutional: Negative for fever.   HENT: Negative for congestion.    Respiratory: Negative for shortness of breath.    Gastrointestinal: Negative for diarrhea and vomiting.   Musculoskeletal: Negative for joint pain.   Skin: Positive for itching and rash.   Psychiatric/Behavioral: Negative for depression and hallucinations. The patient is nervous/anxious.      PMH:  has a past medical history of Bipolar 2 disorder (CMS-HCC) (4/30/2010) and HTN (hypertension), benign (4/30/2010). She also has no past medical history of Breast cancer (CMS-HCC).  MEDS:   Current Outpatient Prescriptions:   •  Ivermectin 3 MG Tab, Take 10 mg by mouth Once for 1 dose., Disp: 4 Tab, Rfl: 0  •  hydrOXYzine HCl (ATARAX) 25 MG Tab, Take 1-2 Tabs by mouth 3 times a day as needed for Itching or Anxiety., Disp: 30 Tab, Rfl: 0  •  atorvastatin (LIPITOR) 40 MG Tab, Take 1 Tab by mouth every day., Disp: 90 Tab, Rfl: 3  •  ibuprofen (MOTRIN) 200 MG Tab, Take 200  mg by mouth every 6 hours as needed., Disp: , Rfl:   •  fluticasone (FLONASE) 50 MCG/ACT nasal spray, Spray 1 Spray in nose every day., Disp: 16 g, Rfl: 6  •  Ciprofloxacin HCl 0.2 % Solution, Place 2 Drops in ear every 6 hours., Disp: 1 Each, Rfl: 0  ALLERGIES:   Allergies   Allergen Reactions   • Codeine      SURGHX:   Past Surgical History:   Procedure Laterality Date   • PRIMARY C SECTION  1978   • CHOLECYSTECTOMY       SOCHX:  reports that she has been smoking Cigarettes.  She started smoking about 46 years ago. She has a 46.00 pack-year smoking history. She has never used smokeless tobacco. She reports that she does not drink alcohol or use drugs.  FH: family history includes Cancer in her father, mother, and sister; Heart Disease in her maternal grandmother.       Objective:     /64   Pulse 85   Temp 36.9 °C (98.5 °F)   Resp 14   Wt 64.4 kg (142 lb)   SpO2 96%   BMI 21.60 kg/m²      Physical Exam   Constitutional: She is oriented to person, place, and time. Vital signs are normal. She appears well-developed and well-nourished. She is cooperative.  Non-toxic appearance. She does not appear ill.   HENT:   Head: Hair is normal.   No scalp nits noted.   Eyes: Conjunctivae are normal.   Pulmonary/Chest: Effort normal.   Lymphadenopathy:        Head (right side): No posterior auricular adenopathy present.        Head (left side): No posterior auricular adenopathy present.     She has no cervical adenopathy.   No lymphangitis.   Neurological: She is alert and oriented to person, place, and time.   Skin: Skin is warm and dry. Rash noted. Rash is papular.   Grouped papular eruption right medial upper arm, left medial elbow region, anterior abdomen. No cellulitis, no foreign body noted.   Psychiatric: Her speech is normal and behavior is normal. Judgment and thought content normal. Her mood appears anxious. Her affect is not inappropriate. She is not actively hallucinating.               Assessment/Plan:      1. Lice infestation  - Ivermectin 3 MG Tab; Take 10 mg by mouth Once for 1 dose.  Dispense: 4 Tab; Refill: 0    2. Itching  - hydrOXYzine HCl (ATARAX) 25 MG Tab; Take 1-2 Tabs by mouth 3 times a day as needed for Itching or Anxiety.  Dispense: 30 Tab; Refill: 0

## 2017-11-20 NOTE — PATIENT INSTRUCTIONS
You should contact your primary care provider for follow-up if not resolving in one week.  Lice, Adult  Lice are tiny insects with claws on the ends of their legs. They are small parasites that live on the human body. Lice often make their home in a person's hair, such as hair on the head or in the pubic area. Pubic lice are sometimes referred to as crabs. Lice casanova from little round eggs, which are attached to the base of hairs. Lice eggs are also called nits.  Lice cause skin irritation and itching in the area of the infested hair. Although having lice can be annoying, it is not dangerous, and lice do not spread diseases. Treatment will usually clear up the symptoms within a few days.  CAUSES  Lice can spread from one person to another. Lice crawl. They do not fly or jump. To get lice, you must:  · Have very close contact with an infested person.  · Share infested items that touch your skin and hair. These include personal items, such as hats, jamison, brushes, towels, clothing, pillowcases, or sheets.  Pubic lice spread through sexual contact.  RISK FACTORS  Although having lice is more common among young children, anyone can get lice. Lice tend to thrive in warm weather, so that type of weather increases the risk.  SIGNS AND SYMPTOMS  · Itchiness in the affected area.  · Skin irritation.  · Feeling of something moving in the hair.  · Rash or sores on the skin.  · Tiny flakes or sacs near the scalp. These may be white, yellow, or tan.  · Tiny bugs crawling on the hair or scalp.  DIAGNOSIS  Diagnosis is based on your symptoms and a physical exam. Your health care provider will examine the affected area closely for live lice, tiny eggs (nits), and empty egg cases.  Eggs are typically yellow or tan in color. Empty egg cases are whitish. Lice are gray or brown.  TREATMENT  Treatment for lice includes:  · Using a hair rinse that contains a mild insecticide to kill lice. Your health care provider will recommend a  prescription or over-the-counter rinse.  · Removing lice, eggs, and egg cases by using a comb or tweezers.  · Washing and bagging your clothing and bedding.  Pregnant women should not use medicated shampoo or cream without first talking to their health care provider.  HOME CARE INSTRUCTIONS  · Apply medicated rinse as directed by your health care provider. Follow the label instructions carefully. General instructions for applying rinses may include these steps:  ¨ Put on an old shirt or underwear, or use an old towel in case of staining from the rinse.  ¨ Wash and towel-dry your head or pubic area before applying the rinse if directed to do so.  ¨ When your hair is dry, apply the rinse. Leave the rinse in your hair for the amount of time specified in the instructions.  ¨ Rinse the area with water.  ¨ Comb your wet hair close to the skin and down to the ends, removing any lice, eggs, or egg cases.  ¨ Do not wash the infested hair for 2 days while the medicine kills the lice.  ¨ Repeat the treatment if necessary in 7-10 days.  · Check for remaining lice, eggs, or egg cases every 2-3 days for 2 weeks or as directed. After treatment, the remaining lice should be moving more slowly.  · Remove any remaining lice, eggs, or egg cases using a fine-tooth comb.  · Wash all recently used towels, hats, scarves, jackets, bedding, and clothing in hot water.  · Place unwashable items that may have been exposed in closed plastic bags for 2 weeks.  · Soak all jamison and brushes in hot water for 10 minutes.  · Vacuum furniture to remove any loose hair. There is no need to use chemicals, which can be toxic. Lice survive only 1-2 days away from human skin. Eggs may survive only 1 week.  · For pubic lice, tell any sexual partners to seek treatment.  · For head lice, ask your health care provider if other family members or close contacts should be examined or treated as well.  · Keep all follow-up visits as directed by your health care  provider. This is important.  SEEK MEDICAL CARE IF:  · You develop sores that look infected.  · Your rash or sores do not go away in 1 week.  · The lice or eggs return or do not go away in spite of treatment.  MAKE SURE YOU:  · Understand these instructions.  · Will watch your condition.  · Will get help right away if you are not doing well or get worse.     This information is not intended to replace advice given to you by your health care provider. Make sure you discuss any questions you have with your health care provider.     Document Released: 12/18/2006 Document Revised: 01/08/2016 Document Reviewed: 05/05/2015  [employer] Interactive Patient Education ©2016 [employer]

## 2017-12-01 ENCOUNTER — OFFICE VISIT (OUTPATIENT)
Dept: MEDICAL GROUP | Facility: CLINIC | Age: 62
End: 2017-12-01
Payer: MEDICARE

## 2017-12-01 VITALS
HEART RATE: 90 BPM | RESPIRATION RATE: 16 BRPM | TEMPERATURE: 99.2 F | WEIGHT: 147 LBS | DIASTOLIC BLOOD PRESSURE: 68 MMHG | SYSTOLIC BLOOD PRESSURE: 120 MMHG | BODY MASS INDEX: 22.28 KG/M2 | HEIGHT: 68 IN | OXYGEN SATURATION: 92 %

## 2017-12-01 DIAGNOSIS — E78.5 DYSLIPIDEMIA: ICD-10-CM

## 2017-12-01 DIAGNOSIS — R20.2 PARESTHESIAS: ICD-10-CM

## 2017-12-01 DIAGNOSIS — F17.200 TOBACCO DEPENDENCE: ICD-10-CM

## 2017-12-01 DIAGNOSIS — F31.81 BIPOLAR 2 DISORDER (HCC): Chronic | ICD-10-CM

## 2017-12-01 DIAGNOSIS — F32.1 MODERATE SINGLE CURRENT EPISODE OF MAJOR DEPRESSIVE DISORDER (HCC): ICD-10-CM

## 2017-12-01 PROBLEM — F32.9 MAJOR DEPRESSIVE DISORDER WITH SINGLE EPISODE: Status: ACTIVE | Noted: 2017-12-01

## 2017-12-01 PROBLEM — N39.0 UTI (URINARY TRACT INFECTION), UNCOMPLICATED: Status: RESOLVED | Noted: 2017-01-25 | Resolved: 2017-12-01

## 2017-12-01 PROCEDURE — 99214 OFFICE O/P EST MOD 30 MIN: CPT | Performed by: NURSE PRACTITIONER

## 2017-12-01 RX ORDER — ATORVASTATIN CALCIUM 40 MG/1
40 TABLET, FILM COATED ORAL DAILY
Qty: 90 TAB | Refills: 3 | Status: SHIPPED | OUTPATIENT
Start: 2017-12-01

## 2017-12-01 ASSESSMENT — PATIENT HEALTH QUESTIONNAIRE - PHQ9
SUM OF ALL RESPONSES TO PHQ QUESTIONS 1-9: 13
5. POOR APPETITE OR OVEREATING: 1 - SEVERAL DAYS
CLINICAL INTERPRETATION OF PHQ2 SCORE: 2

## 2017-12-01 ASSESSMENT — ENCOUNTER SYMPTOMS
NAUSEA: 0
NERVOUS/ANXIOUS: 1
FLANK PAIN: 0
ABDOMINAL PAIN: 0
SENSORY CHANGE: 1
DEPRESSION: 1
FEVER: 0
VOMITING: 0

## 2017-12-01 NOTE — PROGRESS NOTES
Chief Complaint   Patient presents with   • Other     Patient stated she feels bugs crawling over her body specially on her head and vaginal area.        HISTORY OF PRESENT ILLNESS: Patient is a 61 y.o. female established patient who presents today due to above chief complains.     Dyslipidemia  The 10-year ASCVD risk score (Idania NICHOLS Jr., et al., 2013) is: 9.9%    Values used to calculate the score:      Age: 61 years      Sex: Female      Is Non- : No      Diabetic: No      Tobacco smoker: Yes      Systolic Blood Pressure: 120 mmHg      Is BP treated: No      HDL Cholesterol: 41 mg/dL      Total Cholesterol: 294 mg/dL    Pt reports that she ran out of lipitor and has not taken this for a while. She agrees to resume it. She reports she only follows up with PCP when she is sick. She is instructed to follow up with PCP at least once per year. She agrees and would like to have a follow up appointment.     Bipolar 2 disorder  Pt reports that 7 years ago she was following [facility] and she has not seen therapist for long time. She was on risperidone for a while but has been off for about 7 years. She reports there are buds crawling in her whole body. She has tried the medications given by the urgent care with no help (Atarax and Ivermectin on 11/20.) She does not feel much itchiness but she knows there are bugs in her body. She showed me video with black spot on lining which looks like stain or lint ball/fuzz ball. Pt also shows me one on her underwear. I picked up and found that is fuzz ball with her hair. Pt reports the bugs crawling to her private area now. No rashes, lesion noted to the private/vaginal area, no discharge. Pt denied any UTI symptoms. No fever, no chills.      Tobacco dependence  Pt reports cutting back from 2 pack to 3/4 pack per day. Declined pharmaceutic assistance.       PHQ-9:13    Patient Active Problem List    Diagnosis Date Noted   • Major depressive  disorder with single episode 2017   • Bicornate uterus 2017   • Renal agenesis, unspecified 2017   • Chronic obstructive pulmonary disease (CMS-HCC) 2017   • Tobacco dependence 01/10/2017   • Polycythemia 2011   • Bipolar 2 disorder (CMS-HCC) 2010   • Dyslipidemia 2010       Allergies:Codeine    Current Outpatient Prescriptions   Medication Sig Dispense Refill   • atorvastatin (LIPITOR) 40 MG Tab Take 1 Tab by mouth every day. 90 Tab 3   • hydrOXYzine HCl (ATARAX) 25 MG Tab Take 1-2 Tabs by mouth 3 times a day as needed for Itching or Anxiety. 30 Tab 0   • ibuprofen (MOTRIN) 200 MG Tab Take 200 mg by mouth every 6 hours as needed.     • fluticasone (FLONASE) 50 MCG/ACT nasal spray Spray 1 Spray in nose every day. 16 g 6   • Ciprofloxacin HCl 0.2 % Solution Place 2 Drops in ear every 6 hours. 1 Each 0     No current facility-administered medications for this visit.        Social History   Substance Use Topics   • Smoking status: Current Every Day Smoker     Packs/day: 1.00     Years: 46.00     Types: Cigarettes     Start date: 11/15/1971   • Smokeless tobacco: Never Used   • Alcohol use No       Family Status   Relation Status   • Mother    • Father      Family History   Problem Relation Age of Onset   • Cancer Mother      breast   • Cancer Father      stomach   • Cancer Sister      colon   • Heart Disease Maternal Grandmother          ROS:  Review of Systems   Constitutional: Negative for fever.   Gastrointestinal: Negative for abdominal pain, nausea and vomiting.   Genitourinary: Negative for dysuria, flank pain, frequency, hematuria and urgency.   Skin: Positive for itching. Negative for rash.   Neurological: Positive for sensory change.   Psychiatric/Behavioral: Positive for depression. Negative for suicidal ideas. The patient is nervous/anxious.         Objective:     Blood pressure 120/68, pulse 90, temperature 37.3 °C (99.2 °F), resp. rate 16, height  "1.727 m (5' 8\"), weight 66.7 kg (147 lb), SpO2 92 %.     Physical Exam:  Physical Exam   Constitutional: She is oriented to person, place, and time and well-developed, well-nourished, and in no distress.   HENT:   Head: Normocephalic and atraumatic.   Eyes: Conjunctivae are normal.   Neck: Neck supple. No JVD present.   Cardiovascular: Normal rate and regular rhythm.    Pulmonary/Chest: Effort normal and breath sounds normal. No respiratory distress. She has no wheezes. She has no rales.   Abdominal: Soft. Bowel sounds are normal. She exhibits no distension. There is no tenderness. There is no rebound.   Genitourinary: No vaginal discharge found.   Musculoskeletal: Normal range of motion. She exhibits no edema.   Neurological: She is alert and oriented to person, place, and time.   Skin: Skin is warm. No rash noted. No erythema.   Vitals reviewed.        Assessment/Plan:  Pt has no had blood test for a while. Will check his kidney and liver function to exclude out any possible kidney or liver disease related pruritis or electrolyte imbalance related paresthesia even though it seems less likely. However, with this suggestion, pt feels less upset that people always think she is lunatic. She feels better that we are also going to check for physical disease not just focus on her mental health problem so that she is willing to have psychiatrist referral to help us on finding the causes.    Scheduled to follow up with PCP on 12/21.    1. Paresthesias  - CBC WITH DIFFERENTIAL; Future  - COMP METABOLIC PANEL; Future  - HEMOGLOBIN A1C; Future  - REFERRAL TO PSYCHIATRY    2. Dyslipidemia  - CBC WITH DIFFERENTIAL; Future  - COMP METABOLIC PANEL; Future  - atorvastatin (LIPITOR) 40 MG Tab; Take 1 Tab by mouth every day.  Dispense: 90 Tab; Refill: 3  - TSH+FREE T4  - HEMOGLOBIN A1C; Future    3. Bipolar 2 disorder (CMS-HCC)  - REFERRAL TO PSYCHIATRY    4. Moderate single current episode of major depressive disorder (CMS-HCC)  - " REFERRAL TO PSYCHIATRY  PHQ-9:13    Differential diagnoses and indications for immediate follow-up discussed with patient.    Instructed to return to clinic or nearest emergency department for any change in condition, further concerns, or worsening of symptoms.    The patient demonstrated a good understanding and agreed with the treatment plan.

## 2017-12-01 NOTE — ASSESSMENT & PLAN NOTE
Pt reports that 7 years ago she was following [facility] and she has not seen therapist for long time. She was on risperidone for a while but has been off for about 7 years. She reports there are buds crawling in her whole body. She has tried the medications given by the urgent care with no help (Atarax and Ivermectin on 11/20.) She does not feel much itchiness but she knows there are bugs in her body. She showed me video with black spot on lining which looks like stain or lint ball/fuzz ball. Pt also shows me one on her underwear. I picked up and found that is fuzz ball with her hair. Pt reports the bugs crawling to her private area now. No rashes, lesion noted to the private/vaginal area, no discharge. Pt denied any UTI symptoms. No fever, no chills.

## 2017-12-01 NOTE — ASSESSMENT & PLAN NOTE
The 10-year ASCVD risk score (Idania NICHOLS Jr., et al., 2013) is: 9.9%    Values used to calculate the score:      Age: 61 years      Sex: Female      Is Non- : No      Diabetic: No      Tobacco smoker: Yes      Systolic Blood Pressure: 120 mmHg      Is BP treated: No      HDL Cholesterol: 41 mg/dL      Total Cholesterol: 294 mg/dL    Pt reports that she ran out of lipitor and has not taken this for a while. She agrees to resume it. She reports she only follows up with PCP when she is sick. She is instructed to follow up with PCP at least once per year. She agrees and would like to have a follow up appointment.

## 2017-12-05 ENCOUNTER — OFFICE VISIT (OUTPATIENT)
Dept: MEDICAL GROUP | Facility: MEDICAL CENTER | Age: 62
End: 2017-12-05
Payer: MEDICARE

## 2017-12-05 VITALS
SYSTOLIC BLOOD PRESSURE: 122 MMHG | TEMPERATURE: 98.7 F | OXYGEN SATURATION: 98 % | WEIGHT: 143 LBS | HEIGHT: 68 IN | BODY MASS INDEX: 21.67 KG/M2 | DIASTOLIC BLOOD PRESSURE: 74 MMHG | RESPIRATION RATE: 16 BRPM | HEART RATE: 97 BPM
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
## 2017-12-05 DIAGNOSIS — L29.9 ITCHING: ICD-10-CM

## 2017-12-05 PROCEDURE — 99213 OFFICE O/P EST LOW 20 MIN: CPT | Performed by: FAMILY MEDICINE

## 2017-12-05 RX ORDER — HYDROXYZINE HYDROCHLORIDE 25 MG/1
25 TABLET, FILM COATED ORAL 3 TIMES DAILY PRN
Qty: 20 TAB | Refills: 0 | Status: SHIPPED | OUTPATIENT
Start: 2017-12-05 | End: 2018-01-17

## 2017-12-05 NOTE — PROGRESS NOTES
"CC: Bugs bite/ itching    HPI:   Aimee presents today because of bugs bite causing itching on mainly upper extremities, some on the truck and lower extremites. Patient stated that no one wants to believe her and was told that she needs to see a psychiatrist, that is why she brings what looks like bed bugs in a zip lock. Discussed with patient that she need to contact her  to call for bugs  , Advised to wash her clothes with hot water , and get red of the mattress of infested.      Patient Active Problem List    Diagnosis Date Noted   • Major depressive disorder with single episode 12/01/2017   • Bicornate uterus 02/02/2017   • Renal agenesis, unspecified 02/02/2017   • Chronic obstructive pulmonary disease (CMS-HCC) 01/23/2017   • Tobacco dependence 01/10/2017   • Polycythemia 05/31/2011   • Bipolar 2 disorder (CMS-HCC) 04/30/2010   • Dyslipidemia 04/30/2010       Current Outpatient Prescriptions   Medication Sig Dispense Refill   • hydrOXYzine HCl (ATARAX) 25 MG Tab Take 1 Tab by mouth 3 times a day as needed for Itching. 20 Tab 0   • atorvastatin (LIPITOR) 40 MG Tab Take 1 Tab by mouth every day. 90 Tab 3   • ibuprofen (MOTRIN) 200 MG Tab Take 200 mg by mouth every 6 hours as needed.     • fluticasone (FLONASE) 50 MCG/ACT nasal spray Spray 1 Spray in nose every day. 16 g 6   • Ciprofloxacin HCl 0.2 % Solution Place 2 Drops in ear every 6 hours. 1 Each 0     No current facility-administered medications for this visit.          Allergies as of 12/05/2017 - Reviewed 12/05/2017   Allergen Reaction Noted   • Codeine  05/20/2010        ROS: Denies any chest pain, Shortness of breath, Changes bowel or bladder, Lower extremity edema.    Physical Exam:  /74   Pulse 97   Temp 37.1 °C (98.7 °F)   Resp 16   Ht 1.727 m (5' 8\")   Wt 64.9 kg (143 lb)   SpO2 98%   BMI 21.74 kg/m²   Gen.: Well-developed, well-nourished, no apparent distress,pleasant and cooperative with the " examination  Skin:  Few area of small (pin point size) red itchy rash on upper extremities.            Assessment and Plan.   61 y.o. female     1. Bug bite, subsequent encounter  Advised to call for bugs    Wash closes with hot water, and put them in a hot drier.  Symptomatic treatment for itching is given.    2. Itching    - hydrOXYzine HCl (ATARAX) 25 MG Tab; Take 1 Tab by mouth 3 times a day as needed for Itching.  Dispense: 20 Tab; Refill: 0

## 2017-12-08 ENCOUNTER — OFFICE VISIT (OUTPATIENT)
Dept: MEDICAL GROUP | Facility: MEDICAL CENTER | Age: 62
End: 2017-12-08
Payer: MEDICARE

## 2017-12-08 VITALS
DIASTOLIC BLOOD PRESSURE: 66 MMHG | TEMPERATURE: 99.4 F | RESPIRATION RATE: 16 BRPM | HEART RATE: 70 BPM | OXYGEN SATURATION: 95 % | WEIGHT: 144.8 LBS | SYSTOLIC BLOOD PRESSURE: 126 MMHG | BODY MASS INDEX: 21.95 KG/M2 | HEIGHT: 68 IN

## 2017-12-08 DIAGNOSIS — F22 DELUSIONS OF PARASITOSIS (HCC): ICD-10-CM

## 2017-12-08 DIAGNOSIS — Z12.4 SCREENING FOR CERVICAL CANCER: ICD-10-CM

## 2017-12-08 DIAGNOSIS — F31.81 BIPOLAR 2 DISORDER (HCC): Chronic | ICD-10-CM

## 2017-12-08 PROCEDURE — 99213 OFFICE O/P EST LOW 20 MIN: CPT | Performed by: INTERNAL MEDICINE

## 2017-12-08 NOTE — PROGRESS NOTES
"CC: Infestation    HPI:   Aimee presents today with the following.    1. Bipolar 2 disorder (CMS-HCC)  History of bipolar disorder per record unknown were diagnosis made currently not seeing psychiatry.    2. Delusions of parasitosis (CMS-HCC)  Comes in today with which she reports is multiple case of evidence of infestation. She has 2 cherry hemangiomas on her chest. She is hypervigilant using a magnifying glass around her house to find evidence of bugs. She denies any sores or major evidence of personal injury from bugs. She has had pain medication as well.    3. Screening for cervical cancer        Patient Active Problem List    Diagnosis Date Noted   • Major depressive disorder with single episode 12/01/2017   • Bicornate uterus 02/02/2017   • Renal agenesis, unspecified 02/02/2017   • Chronic obstructive pulmonary disease (CMS-HCC) 01/23/2017   • Tobacco dependence 01/10/2017   • Polycythemia 05/31/2011   • Bipolar 2 disorder (CMS-HCC) 04/30/2010   • Dyslipidemia 04/30/2010       Current Outpatient Prescriptions   Medication Sig Dispense Refill   • hydrOXYzine HCl (ATARAX) 25 MG Tab Take 1 Tab by mouth 3 times a day as needed for Itching. 20 Tab 0   • atorvastatin (LIPITOR) 40 MG Tab Take 1 Tab by mouth every day. 90 Tab 3   • ibuprofen (MOTRIN) 200 MG Tab Take 200 mg by mouth every 6 hours as needed.     • fluticasone (FLONASE) 50 MCG/ACT nasal spray Spray 1 Spray in nose every day. 16 g 6   • Ciprofloxacin HCl 0.2 % Solution Place 2 Drops in ear every 6 hours. 1 Each 0     No current facility-administered medications for this visit.          Allergies as of 12/08/2017 - Reviewed 12/05/2017   Allergen Reaction Noted   • Codeine  05/20/2010        ROS: As per HPI.    /66   Pulse 70   Temp 37.4 °C (99.4 °F)   Resp 16   Ht 1.727 m (5' 8\")   Wt 65.7 kg (144 lb 12.8 oz)   SpO2 95%   BMI 22.02 kg/m²     Physical Exam:  Gen:         Alert and oriented, No apparent distress.  Neck:        No " Lymphadenopathy or Bruits.  Lungs:     Clear to auscultation bilaterally  CV:          Regular rate and rhythm. No murmurs, rubs or gallops.               Ext:          No clubbing, cyanosis, edema.      Assessment and Plan.   61 y.o. female with the following issues.    1. Bipolar 2 disorder (CMS-HCC)  Referring psychiatry.  - REFERRAL TO PSYCHOLOGY    2. Delusions of parasitosis (CMS-HCC)  No overt evidence of infestation she is willing to entertain the idea that she might have an overreaction to her current situation and is accepting a referral to psychology and already been referred to psychiatry.    3. Screening for cervical cancer    - REFERRAL TO GYNECOLOGY

## 2017-12-11 ENCOUNTER — TELEPHONE (OUTPATIENT)
Dept: RADIOLOGY | Facility: MEDICAL CENTER | Age: 62
End: 2017-12-11

## 2017-12-14 ENCOUNTER — OFFICE VISIT (OUTPATIENT)
Dept: BEHAVIORAL HEALTH | Facility: PHYSICIAN GROUP | Age: 62
End: 2017-12-14
Payer: MEDICARE

## 2017-12-14 DIAGNOSIS — F31.9 BIPOLAR DISORDER WITH PSYCHOTIC FEATURES (HCC): ICD-10-CM

## 2017-12-14 PROCEDURE — 90791 PSYCH DIAGNOSTIC EVALUATION: CPT | Performed by: SOCIAL WORKER
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
## 2017-12-18 ENCOUNTER — APPOINTMENT (RX ONLY)
Dept: URBAN - METROPOLITAN AREA CLINIC 20 | Facility: CLINIC | Age: 62
Setting detail: DERMATOLOGY
End: 2017-12-18

## 2017-12-18 DIAGNOSIS — L29.89 OTHER PRURITUS: ICD-10-CM

## 2017-12-18 DIAGNOSIS — L29.8 OTHER PRURITUS: ICD-10-CM

## 2017-12-18 PROBLEM — E78.5 HYPERLIPIDEMIA, UNSPECIFIED: Status: ACTIVE | Noted: 2017-12-18

## 2017-12-18 PROCEDURE — ? COUNSELING

## 2017-12-18 PROCEDURE — 99214 OFFICE O/P EST MOD 30 MIN: CPT

## 2017-12-18 PROCEDURE — ? PRESCRIPTION

## 2017-12-18 PROCEDURE — ? ADDITIONAL NOTES

## 2017-12-18 RX ORDER — TRIAMCINOLONE ACETONIDE 1 MG/G
CREAM TOPICAL BID
Qty: 1 | Refills: 1 | Status: ERX | COMMUNITY
Start: 2017-12-18

## 2017-12-18 RX ADMIN — TRIAMCINOLONE ACETONIDE: 1 CREAM TOPICAL at 18:41

## 2017-12-18 ASSESSMENT — LOCATION SIMPLE DESCRIPTION DERM
LOCATION SIMPLE: RIGHT THIGH
LOCATION SIMPLE: SCALP
LOCATION SIMPLE: LEFT THIGH
LOCATION SIMPLE: RIGHT FOREARM
LOCATION SIMPLE: LEFT FOREARM

## 2017-12-18 ASSESSMENT — LOCATION DETAILED DESCRIPTION DERM
LOCATION DETAILED: LEFT SUPERIOR PARIETAL SCALP
LOCATION DETAILED: RIGHT ANTERIOR DISTAL THIGH
LOCATION DETAILED: LEFT PROXIMAL DORSAL FOREARM
LOCATION DETAILED: RIGHT PROXIMAL DORSAL FOREARM
LOCATION DETAILED: LEFT ANTERIOR DISTAL THIGH
LOCATION DETAILED: LEFT ANTERIOR PROXIMAL THIGH

## 2017-12-18 ASSESSMENT — LOCATION ZONE DERM
LOCATION ZONE: SCALP
LOCATION ZONE: ARM
LOCATION ZONE: LEG

## 2017-12-18 NOTE — PROCEDURE: COUNSELING
Patient Specific Counseling (Will Not Stick From Patient To Patient): Baggie of black debris with white/black underwear which was cut by pt for sample was evaluated undermicroscope with black fibers noted-negative for insects. Reviewed with pt that the underwear black stripe is the culprit for black specks which could be mistaken for bugs. Advised f/u with psychologist for stress management and encouraged her to re-start with the assistance of psychiatrist her bi polar medications which she states she has been off a \"long time\".\\nWritten instructions for fragrance free protocol, liberal use of Cetaphil lotion and may take Zyretec prn itch. She states she did not tolerate Atarax secondary to mental fogginess.
Detail Level: Detailed

## 2017-12-18 NOTE — PROCEDURE: ADDITIONAL NOTES
Additional Notes: skin is clear on today's exam with exception to several small excoriations on left ankle region.

## 2017-12-18 NOTE — HPI: RASH (SCABIES)
How Severe Is It?: mild
Is This A New Presentation, Or A Follow-Up?: Rash
Additional History: Bought a new bed, linen and pillows. Worried she has bugs such as lice or scabies as her skin feels like it is crawling. Brought in baggie of cut black and white underwear with multiple black debris which she states are bugs.  Was seen by Urgent Care and PCP which both reassured her that she does not have infestation.  Of note, she has been under increased stress lately. She is seeing a psychologist at [facility]-pt unsure of name of provider.  Initial visit was last week.  We will reqeust MR from urgent care and Dr. Lee (PCP).

## 2018-01-17 ENCOUNTER — OFFICE VISIT (OUTPATIENT)
Dept: URGENT CARE | Facility: CLINIC | Age: 63
End: 2018-01-17
Payer: MEDICARE

## 2018-01-17 VITALS
HEART RATE: 84 BPM | HEIGHT: 68 IN | OXYGEN SATURATION: 97 % | BODY MASS INDEX: 21.67 KG/M2 | DIASTOLIC BLOOD PRESSURE: 78 MMHG | SYSTOLIC BLOOD PRESSURE: 122 MMHG | TEMPERATURE: 97.8 F | WEIGHT: 143 LBS

## 2018-01-17 DIAGNOSIS — J02.9 PHARYNGITIS, UNSPECIFIED ETIOLOGY: ICD-10-CM

## 2018-01-17 DIAGNOSIS — J01.40 ACUTE NON-RECURRENT PANSINUSITIS: ICD-10-CM

## 2018-01-17 DIAGNOSIS — H66.001 ACUTE SUPPURATIVE OTITIS MEDIA OF RIGHT EAR WITHOUT SPONTANEOUS RUPTURE OF TYMPANIC MEMBRANE, RECURRENCE NOT SPECIFIED: Primary | ICD-10-CM

## 2018-01-17 PROCEDURE — 99214 OFFICE O/P EST MOD 30 MIN: CPT | Performed by: PHYSICIAN ASSISTANT

## 2018-01-17 RX ORDER — AMOXICILLIN AND CLAVULANATE POTASSIUM 875; 125 MG/1; MG/1
1 TABLET, FILM COATED ORAL 2 TIMES DAILY
Qty: 20 TAB | Refills: 0 | Status: SHIPPED | OUTPATIENT
Start: 2018-01-17 | End: 2018-01-27

## 2018-01-17 NOTE — PATIENT INSTRUCTIONS
"Otitis Media With Effusion  Otitis media with effusion is the presence of fluid in the middle ear. This is a common problem in children, which often follows ear infections. It may be present for weeks or longer after the infection. Unlike an acute ear infection, otitis media with effusion refers only to fluid behind the ear drum and not infection. Children with repeated ear and sinus infections and allergy problems are the most likely to get otitis media with effusion.  CAUSES   The most frequent cause of the fluid buildup is dysfunction of the eustachian tubes. These are the tubes that drain fluid in the ears to the back of the nose (nasopharynx).  SYMPTOMS   · The main symptom of this condition is hearing loss. As a result, you or your child may:  ¨ Listen to the TV at a loud volume.  ¨ Not respond to questions.  ¨ Ask \"what\" often when spoken to.  ¨ Mistake or confuse one sound or word for another.  · There may be a sensation of fullness or pressure but usually not pain.  DIAGNOSIS   · Your health care provider will diagnose this condition by examining you or your child's ears.  · Your health care provider may test the pressure in you or your child's ear with a tympanometer.  · A hearing test may be conducted if the problem persists.  TREATMENT   · Treatment depends on the duration and the effects of the effusion.  · Antibiotics, decongestants, nose drops, and cortisone-type drugs (tablets or nasal spray) may not be helpful.  · Children with persistent ear effusions may have delayed language or behavioral problems. Children at risk for developmental delays in hearing, learning, and speech may require referral to a specialist earlier than children not at risk.  · You or your child's health care provider may suggest a referral to an ear, nose, and throat surgeon for treatment. The following may help restore normal hearing:  ¨ Drainage of fluid.  ¨ Placement of ear tubes (tympanostomy tubes).  ¨ Removal of adenoids " (adenoidectomy).  HOME CARE INSTRUCTIONS   · Avoid secondhand smoke.  · Infants who are  are less likely to have this condition.  · Avoid feeding infants while they are lying flat.  · Avoid known environmental allergens.  · Avoid people who are sick.  SEEK MEDICAL CARE IF:   · Hearing is not better in 3 months.  · Hearing is worse.  · Ear pain.  · Drainage from the ear.  · Dizziness.  MAKE SURE YOU:   · Understand these instructions.  · Will watch your condition.  · Will get help right away if you are not doing well or get worse.     This information is not intended to replace advice given to you by your health care provider. Make sure you discuss any questions you have with your health care provider.     Document Released: 01/25/2006 Document Revised: 01/08/2016 Document Reviewed: 07/15/2014  [employer] Interactive Patient Education ©2016 [employer]

## 2018-01-17 NOTE — PROGRESS NOTES
Subjective:      Pt is a 62 y.o. female who presents with Otalgia (Right ear ache/sore throat/felt 'pop' in left ear several days ago)            HPI  PT presents to  clinic today complaining of sore throat,  pressure in ears, cough, fatigue, runny nose. PT denies CP, SOB, NVD, abdominal pain, joint pain. PT states these symptoms began around 3 days ago. PT states the pain is a 5/10 with ear pain, aching in nature and worse at night.  Pt has not taken any RX medications for this condition. The pt's medication list, problem list, and allergies have been evaluated and reviewed during today's visit.      PMH:  Past Medical History:   Diagnosis Date   • Bipolar 2 disorder (CMS-HCC) 2010   • HTN (hypertension), benign 2010       PSH:  Past Surgical History:   Procedure Laterality Date   • PRIMARY C SECTION     • CHOLECYSTECTOMY         Fam Hx:    family history includes Cancer in her father, mother, and sister; Heart Disease in her maternal grandmother.  Family Status   Relation Status   • Mother    • Father    • Sister    • Maternal Grandmother        Soc HX:  Social History     Social History   • Marital status: Legally      Spouse name: N/A   • Number of children: N/A   • Years of education: N/A     Occupational History   • Not on file.     Social History Main Topics   • Smoking status: Current Every Day Smoker     Packs/day: 1.00     Years: 46.00     Types: Cigarettes     Start date: 11/15/1971   • Smokeless tobacco: Never Used   • Alcohol use No   • Drug use: No   • Sexual activity: Yes     Partners: Male     Other Topics Concern   • Not on file     Social History Narrative   • No narrative on file         Medications:    Current Outpatient Prescriptions:   •  amoxicillin-clavulanate (AUGMENTIN) 875-125 MG Tab, Take 1 Tab by mouth 2 times a day for 10 days., Disp: 20 Tab, Rfl: 0  •  atorvastatin (LIPITOR) 40 MG Tab, Take 1 Tab by mouth every day., Disp: 90 Tab, Rfl: 3  •   "ibuprofen (MOTRIN) 200 MG Tab, Take 200 mg by mouth every 6 hours as needed., Disp: , Rfl:       Allergies:  Codeine and Nickel    ROS    Constitutional: Positive for malaise/fatigue.   HENT: Positive for congestion and sore throat. POS for right ear pain.    Eyes: Negative for blurred vision, double vision and photophobia.   Respiratory: Positive for cough and sputum production. Negative for hemoptysis, shortness of breath and wheezing.    Cardiovascular: Negative for chest pain and palpitations.   Gastrointestinal: Negative for nausea, vomiting, abdominal pain, diarrhea and constipation.   Genitourinary: Negative for dysuria and flank pain.   Musculoskeletal: Negative for falls and myalgias.   Skin: Negative for itching and rash.   Neurological:  Negative for dizziness and tingling.   Endo/Heme/Allergies: Does not bruise/bleed easily.   Psychiatric/Behavioral: Negative for depression. The patient is not nervous/anxious.    All other systems reviewed and are negative.       Objective:     /78   Pulse 84   Temp 36.6 °C (97.8 °F)   Ht 1.727 m (5' 8\")   Wt 64.9 kg (143 lb)   SpO2 97%   BMI 21.74 kg/m²      Physical Exam      Constitutional: PT is oriented to person, place, and time. PT appears well-developed and well-nourished. No distress.   HENT:   Head: Normocephalic and atraumatic.   Right Ear: Hearing, external ear and ear canal normal. Tympanic membrane is erythematous and bulging. A middle ear effusion is present.   Left Ear: Hearing, tympanic membrane, external ear and ear canal normal.   Nose: Mucosal edema, rhinorrhea and sinus tenderness present. Right sinus exhibits frontal sinus tenderness. Left sinus exhibits frontal sinus tenderness.   Mouth/Throat: Uvula is midline. Mucous membranes are pale. Posterior oropharyngeal edema and posterior oropharyngeal erythema present. No oropharyngeal exudate.   Eyes: Conjunctivae normal and EOM are normal. Pupils are equal, round, and reactive to light. "   Neck: Normal range of motion. Neck supple. No thyromegaly present.   Cardiovascular: Normal rate, regular rhythm, normal heart sounds and intact distal pulses.  Exam reveals no gallop and no friction rub.    No murmur heard.  Pulmonary/Chest: Effort normal and breath sounds normal. No respiratory distress. PT has no wheezes. PT has no rales. PT exhibits no tenderness.   Abdominal: Soft. Bowel sounds are normal. PT exhibits no distension and no mass. There is no tenderness. There is no rebound and no guarding.   Musculoskeletal: Normal range of motion. PT exhibits no edema and no tenderness.   Lymphadenopathy:     PT has no cervical adenopathy.   Neurological: PT is alert and oriented to person, place, and time. PT displays normal reflexes. No cranial nerve deficit. PT exhibits normal muscle tone. Coordination normal.   Skin: Skin is warm and dry. No rash noted. No erythema.   Psychiatric: PT has a normal mood and affect. PT behavior is normal. Judgment and thought content normal.          Assessment/Plan:     1. Acute suppurative otitis media of right ear without spontaneous rupture of tympanic membrane, recurrence not specified    - amoxicillin-clavulanate (AUGMENTIN) 875-125 MG Tab; Take 1 Tab by mouth 2 times a day for 10 days.  Dispense: 20 Tab; Refill: 0    2. Acute non-recurrent pansinusitis    - amoxicillin-clavulanate (AUGMENTIN) 875-125 MG Tab; Take 1 Tab by mouth 2 times a day for 10 days.  Dispense: 20 Tab; Refill: 0    3. Pharyngitis, unspecified etiology    Rest, fluids encouraged.  OTC decongestant for congestion/cough  AVS with medical info given.  Pt was in full understanding and agreement with the plan.  Follow-up as needed if symptoms worsen or fail to improve.

## 2018-01-18 ENCOUNTER — TELEPHONE (OUTPATIENT)
Dept: RADIOLOGY | Facility: MEDICAL CENTER | Age: 63
End: 2018-01-18

## 2018-01-19 ENCOUNTER — HOSPITAL ENCOUNTER (OUTPATIENT)
Dept: RADIOLOGY | Facility: MEDICAL CENTER | Age: 63
End: 2018-01-19
Attending: INTERNAL MEDICINE
Payer: MEDICARE

## 2018-01-19 DIAGNOSIS — N64.4 BREAST PAIN, LEFT: ICD-10-CM

## 2018-01-19 DIAGNOSIS — R92.8 ABNORMAL MAMMOGRAM: ICD-10-CM

## 2018-01-19 PROCEDURE — 77066 DX MAMMO INCL CAD BI: CPT

## 2018-01-19 PROCEDURE — 76642 ULTRASOUND BREAST LIMITED: CPT | Mod: LT

## 2018-01-25 ENCOUNTER — OFFICE VISIT (OUTPATIENT)
Dept: URGENT CARE | Facility: CLINIC | Age: 63
End: 2018-01-25
Payer: MEDICARE

## 2018-01-25 VITALS
BODY MASS INDEX: 21.67 KG/M2 | HEIGHT: 68 IN | HEART RATE: 80 BPM | TEMPERATURE: 99.1 F | SYSTOLIC BLOOD PRESSURE: 118 MMHG | RESPIRATION RATE: 14 BRPM | OXYGEN SATURATION: 98 % | WEIGHT: 143 LBS | DIASTOLIC BLOOD PRESSURE: 68 MMHG

## 2018-01-25 DIAGNOSIS — J01.41 ACUTE RECURRENT PANSINUSITIS: ICD-10-CM

## 2018-01-25 DIAGNOSIS — H66.004 RECURRENT ACUTE SUPPURATIVE OTITIS MEDIA OF RIGHT EAR WITHOUT SPONTANEOUS RUPTURE OF TYMPANIC MEMBRANE: ICD-10-CM

## 2018-01-25 PROCEDURE — 99214 OFFICE O/P EST MOD 30 MIN: CPT | Performed by: PHYSICIAN ASSISTANT

## 2018-01-25 RX ORDER — CLINDAMYCIN HYDROCHLORIDE 300 MG/1
300 CAPSULE ORAL 3 TIMES DAILY
Qty: 30 CAP | Refills: 0 | Status: SHIPPED | OUTPATIENT
Start: 2018-01-25 | End: 2018-02-13

## 2018-01-25 NOTE — PATIENT INSTRUCTIONS
"Otitis Media With Effusion  Otitis media with effusion is the presence of fluid in the middle ear. This is a common problem in children, which often follows ear infections. It may be present for weeks or longer after the infection. Unlike an acute ear infection, otitis media with effusion refers only to fluid behind the ear drum and not infection. Children with repeated ear and sinus infections and allergy problems are the most likely to get otitis media with effusion.  CAUSES   The most frequent cause of the fluid buildup is dysfunction of the eustachian tubes. These are the tubes that drain fluid in the ears to the back of the nose (nasopharynx).  SYMPTOMS   · The main symptom of this condition is hearing loss. As a result, you or your child may:  ¨ Listen to the TV at a loud volume.  ¨ Not respond to questions.  ¨ Ask \"what\" often when spoken to.  ¨ Mistake or confuse one sound or word for another.  · There may be a sensation of fullness or pressure but usually not pain.  DIAGNOSIS   · Your health care provider will diagnose this condition by examining you or your child's ears.  · Your health care provider may test the pressure in you or your child's ear with a tympanometer.  · A hearing test may be conducted if the problem persists.  TREATMENT   · Treatment depends on the duration and the effects of the effusion.  · Antibiotics, decongestants, nose drops, and cortisone-type drugs (tablets or nasal spray) may not be helpful.  · Children with persistent ear effusions may have delayed language or behavioral problems. Children at risk for developmental delays in hearing, learning, and speech may require referral to a specialist earlier than children not at risk.  · You or your child's health care provider may suggest a referral to an ear, nose, and throat surgeon for treatment. The following may help restore normal hearing:  ¨ Drainage of fluid.  ¨ Placement of ear tubes (tympanostomy tubes).  ¨ Removal of adenoids " (adenoidectomy).  HOME CARE INSTRUCTIONS   · Avoid secondhand smoke.  · Infants who are  are less likely to have this condition.  · Avoid feeding infants while they are lying flat.  · Avoid known environmental allergens.  · Avoid people who are sick.  SEEK MEDICAL CARE IF:   · Hearing is not better in 3 months.  · Hearing is worse.  · Ear pain.  · Drainage from the ear.  · Dizziness.  MAKE SURE YOU:   · Understand these instructions.  · Will watch your condition.  · Will get help right away if you are not doing well or get worse.     This information is not intended to replace advice given to you by your health care provider. Make sure you discuss any questions you have with your health care provider.     Document Released: 01/25/2006 Document Revised: 01/08/2016 Document Reviewed: 07/15/2014  [employer] Interactive Patient Education ©2016 [employer]

## 2018-01-26 NOTE — PROGRESS NOTES
Subjective:   Pt is a 62 y.o. female who presents with Otalgia ((R) ear, pt states she was here 10 days and not feeling better and feels like is geting to her sinuses)            HPI  PT presents to  clinic today complaining of continued pain in right ear, runny nose, post nasal drip, sinus pressure. PT denies CP, SOB, NVD, abdominal pain, joint pain. PT states these symptoms began around 10 days ago and that the pt was seen at the  and took Augmentin 8 days ago. PT states the pain is a 5-6/10 with right ear pain, aching in nature and worse at night.  The pt's medication list, problem list, and allergies have been evaluated and reviewed during today's visit.    PMH:  Past Medical History:   Diagnosis Date   • Bipolar 2 disorder (CMS-HCC) 2010   • HTN (hypertension), benign 2010       PSH:  Past Surgical History:   Procedure Laterality Date   • PRIMARY C SECTION     • CHOLECYSTECTOMY         Fam Hx:    family history includes Cancer in her father, mother, and sister; Heart Disease in her maternal grandmother.  Family Status   Relation Status   • Mother    • Father    • Sister    • Maternal Grandmother        Soc HX:  Social History     Social History   • Marital status: Single     Spouse name: N/A   • Number of children: N/A   • Years of education: N/A     Occupational History   • Not on file.     Social History Main Topics   • Smoking status: Current Every Day Smoker     Packs/day: 1.00     Years: 46.00     Types: Cigarettes     Start date: 11/15/1971   • Smokeless tobacco: Never Used   • Alcohol use No   • Drug use: No   • Sexual activity: Yes     Partners: Male     Other Topics Concern   • Not on file     Social History Narrative   • No narrative on file         Medications:    Current Outpatient Prescriptions:   •  clindamycin (CLEOCIN) 300 MG Cap, Take 1 Cap by mouth 3 times a day., Disp: 30 Cap, Rfl: 0  •  amoxicillin-clavulanate (AUGMENTIN) 875-125 MG Tab, Take 1 Tab by mouth  "2 times a day for 10 days., Disp: 20 Tab, Rfl: 0  •  atorvastatin (LIPITOR) 40 MG Tab, Take 1 Tab by mouth every day., Disp: 90 Tab, Rfl: 3  •  ibuprofen (MOTRIN) 200 MG Tab, Take 200 mg by mouth every 6 hours as needed., Disp: , Rfl:       Allergies:  Codeine and Nickel    ROS  Review of Systems   Constitutional: Positive for malaise/fatigue. Negative for fever.   HENT: NEG for sore throat +right ear pain  Eyes: Negative for blurred vision, double vision and photophobia.   Respiratory:  Negative for hemoptysis, shortness of breath and wheezing.    Cardiovascular: Negative for chest pain and palpitations.   Gastrointestinal: Negative for nausea, vomiting, abdominal pain, diarrhea and constipation.   Genitourinary: Negative for dysuria and flank pain.   Musculoskeletal: Negative for joint pain and myalgias.   Skin: Negative for itching and rash.   Neurological:  Negative for dizziness and tingling.   Endo/Heme/Allergies: Does not bruise/bleed easily.   Psychiatric/Behavioral: Negative for depression. The patient is not nervous/anxious.           Objective:     /68   Pulse 80   Temp 37.3 °C (99.1 °F)   Resp 14   Ht 1.727 m (5' 8\")   Wt 64.9 kg (143 lb)   SpO2 98%   BMI 21.74 kg/m²      Physical Exam        Physical Exam   Constitutional: Pt is oriented to person, place, and time. Pt appears well-developed and well-nourished. No distress.   HENT:   Head: Normocephalic and atraumatic.   Right Ear: Hearing, external ear and ear canal normal. Tympanic membrane is erythematous and bulging. A middle ear effusion is present.   Left Ear: Hearing, tympanic membrane, external ear and ear canal normal.   Nose: Mucosal edema, rhinorrhea and sinus tenderness present. No nose lacerations, nasal deformity, septal deviation or nasal septal hematoma. No epistaxis.  No foreign bodies. Right sinus exhibits maxillary sinus tenderness and frontal sinus tenderness. Left sinus exhibits maxillary sinus tenderness and frontal " sinus tenderness.   Mouth/Throat: Oropharynx is clear and moist. No oropharyngeal exudate.   Eyes: Conjunctivae normal and EOM are normal. Pupils are equal, round, and reactive to light. Right eye exhibits no discharge. Left eye exhibits no discharge.   Neck: Normal range of motion. Neck supple. No tracheal deviation present. No thyromegaly present.   Cardiovascular: Normal rate, regular rhythm, normal heart sounds and intact distal pulses.  Exam reveals no gallop and no friction rub.    No murmur heard.  Pulmonary/Chest: Effort normal and breath sounds normal. No respiratory distress. Pt has no wheezes. Pt has no rales. Pt exhibits no tenderness.   Abdominal: Soft. Bowel sounds are normal. Pt exhibits no distension and no mass. There is no tenderness. There is no rebound and no guarding.   Musculoskeletal: Normal range of motion. Pt exhibits no edema and no tenderness.   Lymphadenopathy:     Pt has no cervical adenopathy.   Neurological: Pt is alert and oriented to person, place, and time. Pt has normal reflexes. Pt displays normal reflexes. No cranial nerve deficit. Pt exhibits normal muscle tone. Coordination normal.   Skin: Skin is warm and dry. No rash noted. No erythema.   Psychiatric: Pt has a normal mood and affect. Pt behavior is normal. Judgment and thought content normal.            Assessment/Plan:     1. Recurrent acute suppurative otitis media of right ear without spontaneous rupture of tympanic membrane    - REFERRAL TO ENT  - clindamycin (CLEOCIN) 300 MG Cap; Take 1 Cap by mouth 3 times a day.  Dispense: 30 Cap; Refill: 0    2. Acute recurrent pansinusitis    - clindamycin (CLEOCIN) 300 MG Cap; Take 1 Cap by mouth 3 times a day.  Dispense: 30 Cap; Refill: 0    Pt with recalcitrant right otitis media and would do well with ENT referral and evaluation  Rest, fluids encouraged.  OTC decongestant for congestion  AVS with medical info given.  Pt was in full understanding and agreement with the  plan.  Follow-up as needed if symptoms worsen or fail to improve.

## 2018-02-03 ENCOUNTER — HOSPITAL ENCOUNTER (EMERGENCY)
Facility: MEDICAL CENTER | Age: 63
End: 2018-02-03
Attending: EMERGENCY MEDICINE
Payer: MEDICARE

## 2018-02-03 VITALS
WEIGHT: 144.4 LBS | OXYGEN SATURATION: 98 % | TEMPERATURE: 99 F | HEIGHT: 68 IN | HEART RATE: 96 BPM | BODY MASS INDEX: 21.89 KG/M2 | DIASTOLIC BLOOD PRESSURE: 90 MMHG | SYSTOLIC BLOOD PRESSURE: 125 MMHG | RESPIRATION RATE: 16 BRPM

## 2018-02-03 DIAGNOSIS — L50.9 HIVES: ICD-10-CM

## 2018-02-03 PROCEDURE — 700102 HCHG RX REV CODE 250 W/ 637 OVERRIDE(OP): Performed by: EMERGENCY MEDICINE

## 2018-02-03 PROCEDURE — A9270 NON-COVERED ITEM OR SERVICE: HCPCS | Performed by: EMERGENCY MEDICINE

## 2018-02-03 PROCEDURE — 700111 HCHG RX REV CODE 636 W/ 250 OVERRIDE (IP): Performed by: EMERGENCY MEDICINE

## 2018-02-03 PROCEDURE — 99284 EMERGENCY DEPT VISIT MOD MDM: CPT

## 2018-02-03 RX ORDER — PREDNISONE 20 MG/1
40 TABLET ORAL DAILY
Qty: 8 TAB | Refills: 0 | Status: SHIPPED | OUTPATIENT
Start: 2018-02-03 | End: 2018-02-03

## 2018-02-03 RX ORDER — PREDNISONE 20 MG/1
40 TABLET ORAL ONCE
Status: COMPLETED | OUTPATIENT
Start: 2018-02-03 | End: 2018-02-03

## 2018-02-03 RX ORDER — CETIRIZINE HYDROCHLORIDE 10 MG/1
10 TABLET ORAL ONCE
Status: COMPLETED | OUTPATIENT
Start: 2018-02-03 | End: 2018-02-03

## 2018-02-03 RX ORDER — PREDNISONE 20 MG/1
40 TABLET ORAL DAILY
Qty: 8 TAB | Refills: 0 | Status: SHIPPED | OUTPATIENT
Start: 2018-02-03 | End: 2018-02-07

## 2018-02-03 RX ADMIN — CETIRIZINE HYDROCHLORIDE 10 MG: 10 TABLET, FILM COATED ORAL at 18:56

## 2018-02-03 RX ADMIN — PREDNISONE 40 MG: 20 TABLET ORAL at 18:56

## 2018-02-03 ASSESSMENT — PAIN SCALES - GENERAL: PAINLEVEL_OUTOF10: 9

## 2018-02-04 ENCOUNTER — PATIENT OUTREACH (OUTPATIENT)
Dept: HEALTH INFORMATION MANAGEMENT | Facility: OTHER | Age: 63
End: 2018-02-04

## 2018-02-04 NOTE — ED PROVIDER NOTES
ED Provider Note    CHIEF COMPLAINT  Chief Complaint   Patient presents with   • Rash     To chest, abdomen and arms.  Pt denies any change detergent, soaps, foods, etc.          HPI  Aimee Guillen is a 62 y.o. female who presents to the ED with complaints of a rash on her chest, abdomen, arms, pelvis and arm. Patient denies any new detergents, soaps or foods. The patient has been on antibiotic long-term for approximately 2 weeks because of a continuous ear infection. Patient states she's been on the current one for about 14 days and is finishing tomorrow. Patient denies any other symptoms. Denies shortness of breath, throat swelling or any other symptoms.    REVIEW OF SYSTEMS  See HPI for further details. All other systems are negative.     PAST MEDICAL HISTORY  Past Medical History:   Diagnosis Date   • Bipolar 2 disorder (CMS-HCC) 4/30/2010   • HTN (hypertension), benign 4/30/2010       FAMILY HISTORY  Family History   Problem Relation Age of Onset   • Cancer Mother      breast   • Cancer Father      stomach   • Cancer Sister      colon   • Heart Disease Maternal Grandmother      Patient's family history has been discussed and is been found to be noncontributory to his present illness  SOCIAL HISTORY  Social History     Social History   • Marital status: Single     Spouse name: N/A   • Number of children: N/A   • Years of education: N/A     Social History Main Topics   • Smoking status: Current Every Day Smoker     Packs/day: 1.00     Years: 46.00     Types: Cigarettes     Start date: 11/15/1971   • Smokeless tobacco: Never Used   • Alcohol use No   • Drug use: No   • Sexual activity: Yes     Partners: Male     Other Topics Concern   • Not on file     Social History Narrative   • No narrative on file      Pepe West M.D.       SURGICAL HISTORY  Past Surgical History:   Procedure Laterality Date   • PRIMARY C SECTION  1978   • CHOLECYSTECTOMY         CURRENT MEDICATIONS   Home Medications     Reviewed by  "Alba Vsaquez R.N. (Registered Nurse) on 02/03/18 at 1810  Med List Status: Partial   Medication Last Dose Status   atorvastatin (LIPITOR) 40 MG Tab 1/25/2018 Active   clindamycin (CLEOCIN) 300 MG Cap  Active   ibuprofen (MOTRIN) 200 MG Tab 12/5/2017 Active                ALLERGIES   Allergies   Allergen Reactions   • Codeine    • Nickel        PHYSICAL EXAM  VITAL SIGNS: /96   Pulse (!) 103   Temp 37.5 °C (99.5 °F) (Temporal)   Resp 14   Ht 1.727 m (5' 8\")   Wt 65.5 kg (144 lb 6.4 oz)   SpO2 98%   BMI 21.96 kg/m²    Pulse Ox interpretation. Nonhypoxic 96%    Constitutional: Well developed, Well nourished, No acute distress, Non-toxic appearance.   HEENT right TM is normal. There is fluid in the middle canal. She is unable to do a Valsalva to the right ear. Left ear appears normal.  Cardiovascular: Regular rate and rhythm without murmurs gallops or rubs.   Thorax & Lungs: Lungs are clear to auscultation bilaterally, there are no wheezes no rales. Chest wall is nontender.  Skin: She has hives on her anterior chest, back and arms.  Extremities: Intact distal pulses, no clubbing, no cyanosis, no edema, nontender.      COURSE & MEDICAL DECISION MAKING  Pertinent Labs & Imaging studies reviewed. (See chart for details)  Patient has hives. She still has a serous otitis media on the right side hurts most likely aseptic otitis media after being on antibiotics as long. I will start the patient on prednisone which helped both with otitis media as well as with her hives. Recommended for follow-up with her treating physician for recheck in one week. Return as needed.    FINAL IMPRESSION  1. Hives         The patient will return for new or worsening symptoms and is stable at the time of discharge.    The patient is referred to a primary physician for blood pressure management, diabetic screening, and for all other preventative health concerns.        DISPOSITION:  Patient will be discharged home in stable " condition.    FOLLOW UP:  Pepe West M.D.  [address] [place] [id number]  [phone]    Schedule an appointment as soon as possible for a visit in 1 week  For re-check, Return if any symptoms worsen      OUTPATIENT MEDICATIONS:  New Prescriptions    PREDNISONE (DELTASONE) 20 MG TAB    Take 2 Tabs by mouth every day for 4 days.         Electronically signed by: Fabian Kwan, 2/3/2018 6:36 PM

## 2018-02-04 NOTE — DISCHARGE INSTRUCTIONS
Hives  Hives are itchy, red, swollen areas of the skin. They can vary in size and location on your body. Hives can come and go for hours or several days (acute hives) or for several weeks (chronic hives). Hives do not spread from person to person (noncontagious). They may get worse with scratching, exercise, and emotional stress.  CAUSES   · Allergic reaction to food, additives, or drugs.  · Infections, including the common cold.  · Illness, such as vasculitis, lupus, or thyroid disease.  · Exposure to sunlight, heat, or cold.  · Exercise.  · Stress.  · Contact with chemicals.  SYMPTOMS   · Red or white swollen patches on the skin. The patches may change size, shape, and location quickly and repeatedly.  · Itching.  · Swelling of the hands, feet, and face. This may occur if hives develop deeper in the skin.  DIAGNOSIS   Your caregiver can usually tell what is wrong by performing a physical exam. Skin or blood tests may also be done to determine the cause of your hives. In some cases, the cause cannot be determined.  TREATMENT   Mild cases usually get better with medicines such as antihistamines. Severe cases may require an emergency epinephrine injection. If the cause of your hives is known, treatment includes avoiding that trigger.   HOME CARE INSTRUCTIONS   · Avoid causes that trigger your hives.  · Take antihistamines as directed by your caregiver to reduce the severity of your hives. Non-sedating or low-sedating antihistamines are usually recommended. Do not drive while taking an antihistamine.  · Take any other medicines prescribed for itching as directed by your caregiver.  · Wear loose-fitting clothing.  · Keep all follow-up appointments as directed by your caregiver.  SEEK MEDICAL CARE IF:   · You have persistent or severe itching that is not relieved with medicine.  · You have painful or swollen joints.  SEEK IMMEDIATE MEDICAL CARE IF:   · You have a fever.  · Your tongue or lips are swollen.  · You have  trouble breathing or swallowing.  · You feel tightness in the throat or chest.  · You have abdominal pain.  These problems may be the first sign of a life-threatening allergic reaction. Call your local emergency services (911 in [place]  MAKE SURE YOU:   · Understand these instructions.  · Will watch your condition.  · Will get help right away if you are not doing well or get worse.     This information is not intended to replace advice given to you by your health care provider. Make sure you discuss any questions you have with your health care provider.     Document Released: 12/18/2006 Document Revised: 12/23/2014 Document Reviewed: 03/12/2013  [employer] Interactive Patient Education ©2016 [employer]

## 2018-02-04 NOTE — ED TRIAGE NOTES
Chief Complaint   Patient presents with   • Rash     To chest, abdomen and arms.  Pt denies any change detergent, soaps, foods, etc.     Pt to triage in AND.  Pt reports she has been taking antibiotics for the past 18 days for an ear infection.  Pt educated on triage process and instructed to notify triage RN of any change in status.

## 2018-02-13 ENCOUNTER — OFFICE VISIT (OUTPATIENT)
Dept: MEDICAL GROUP | Facility: MEDICAL CENTER | Age: 63
End: 2018-02-13
Payer: MEDICARE

## 2018-02-13 VITALS
HEART RATE: 85 BPM | RESPIRATION RATE: 16 BRPM | SYSTOLIC BLOOD PRESSURE: 112 MMHG | HEIGHT: 68 IN | TEMPERATURE: 98.1 F | WEIGHT: 140 LBS | OXYGEN SATURATION: 95 % | BODY MASS INDEX: 21.22 KG/M2 | DIASTOLIC BLOOD PRESSURE: 66 MMHG

## 2018-02-13 DIAGNOSIS — L50.9 HIVES: ICD-10-CM

## 2018-02-13 DIAGNOSIS — F33.3 SEVERE EPISODE OF RECURRENT MAJOR DEPRESSIVE DISORDER, WITH PSYCHOTIC FEATURES (HCC): ICD-10-CM

## 2018-02-13 DIAGNOSIS — Z00.00 MEDICARE ANNUAL WELLNESS VISIT, SUBSEQUENT: ICD-10-CM

## 2018-02-13 DIAGNOSIS — F17.200 TOBACCO DEPENDENCE: ICD-10-CM

## 2018-02-13 DIAGNOSIS — H92.09 EARACHE: ICD-10-CM

## 2018-02-13 DIAGNOSIS — E78.5 DYSLIPIDEMIA: ICD-10-CM

## 2018-02-13 DIAGNOSIS — F31.81 BIPOLAR 2 DISORDER (HCC): Chronic | ICD-10-CM

## 2018-02-13 DIAGNOSIS — J44.9 CHRONIC OBSTRUCTIVE PULMONARY DISEASE, UNSPECIFIED COPD TYPE (HCC): ICD-10-CM

## 2018-02-13 PROBLEM — F31.9 BIPOLAR DISORDER WITH PSYCHOTIC FEATURES (HCC): Status: RESOLVED | Noted: 2017-12-14 | Resolved: 2018-02-13

## 2018-02-13 PROBLEM — Q60.2 RENAL AGENESIS: Status: RESOLVED | Noted: 2017-02-02 | Resolved: 2018-02-13

## 2018-02-13 PROCEDURE — G0439 PPPS, SUBSEQ VISIT: HCPCS | Mod: 25 | Performed by: INTERNAL MEDICINE

## 2018-02-13 PROCEDURE — 99999 PR NO CHARGE: CPT | Performed by: INTERNAL MEDICINE

## 2018-02-13 ASSESSMENT — PATIENT HEALTH QUESTIONNAIRE - PHQ9
CLINICAL INTERPRETATION OF PHQ2 SCORE: 4
SUM OF ALL RESPONSES TO PHQ QUESTIONS 1-9: 16
5. POOR APPETITE OR OVEREATING: 0 - NOT AT ALL

## 2018-02-13 NOTE — PROGRESS NOTES
CC: Follow-up earache, hives, found to be depressed.    HPI:   Aimee presents today with the following.    1. Medicare annual wellness visit, subsequent  Screenings performed below    2. Hives  Presents after a long course of antibiotics with hives to emergency department. She was placed on a steroid. Antibodies completed approximately 5 days ago. Rash is completely gone no further itching and has no further delusions of parasitosis.    3. Earache  Persistent earache treated with antibiotics significant improved after given a steroid. She does have follow-up with ENT next week. Denies any fevers or chills    4. Severe episode of recurrent major depressive disorder, with psychotic features (CMS-[facility])  Found to be depressed. Carries subjective diagnosis of bipolar disorder. She is highly resistant to medication she canceled her visit with psychology.        Information for advanced given to patient or instructed to bring advanced directives into to office to put in chart.      Depression Screening    Little interest or pleasure in doing things?  2 - more than half the days  Feeling down, depressed , or hopeless? 2 - more than half the days  Trouble falling or staying asleep, or sleeping too much?  3 - nearly every day  Feeling tired or having little energy?  3 - nearly every day  Poor appetite or overeating?  0 - not at all  Feeling bad about yourself - or that you are a failure or have let yourself or your family down? 2 - more than half the days  Trouble concentrating on things, such as reading the newspaper or watching television? 2 - more than half the days  Moving or speaking so slowly that other people could have noticed.  Or the opposite - being so fidgety or restless that you have been moving around a lot more than usual?  2 - more than half the days  Thoughts that you would be better off dead, or of hurting yourself?  0 - not at all  Patient Health Questionnaire Score: 16    If depressive symptoms  identified deferred to follow up visit unless specifically addressed in assessment and plan.    Interpretation of PHQ-9 Total Score   Score Severity   1-4 No Depression   5-9 Mild Depression   10-14 Moderate Depression   15-19 Moderately Severe Depression   20-27 Severe Depression      Screening for Cognitive Impairment    Three Minute Recall (apple, watch, benjamin)  2/3    Draw clock face with all 12 numbers set to the hand to show 10 minutes past 11 o'clock  1 5/5  Cognitive concerns identified deferred for follow up unless specifically addressed in assessment and plan.    Fall Risk Assessment    Has the patient had two or more falls in the last year or any fall with injury in the last year?  No    Safety Assessment    Throw rugs on floor.  Yes  Cautioned about securing or removing.  Handrails on all stairs.  Yes  Good lighting in all hallways.  No  Difficulty hearing.  Yes  Patient counseled about all safety risks that were identified.    Functional Assessment ADLs    Are there any barriers preventing you from cooking for yourself or meeting nutritional needs?  No.    Are there any barriers preventing you from driving safely or obtaining transportation?  No.    Are there any barriers preventing you from using a telephone or calling for help?  No.    Are there any barriers preventing you from shopping?  No.    Are there any barriers preventing you from taking care of your own finances?  No.    Are there any barriers preventing you from managing your medications?  No.    Are currently engaging any exercise or physical activity?  No.       Health Maintenance Summary                PFT SCREENING-FEV1 AND FEV/FVC RATIO / SPIROMETRY SHOULD BE PERFORMED ANNUALLY Overdue 12/15/1974     Annual Wellness Visit Overdue 1/11/2018      Done 1/10/2017     LUNG CANCER SCREENING Next Due 2/15/2018      Done 2/15/2017 CT-LUNG CANCER-SCREENING    MAMMOGRAM Next Due 1/19/2019      Done 1/19/2018 MA-MAMMO DIAGNOSTIC BILAT  W/TOMOSYNTHESIS W/CAD     Patient has more history with this topic...    PAP SMEAR Next Due 1/25/2020      Done 1/25/2017 PATHOLOGY GYN SPECIMEN     Patient has more history with this topic...    COLONOSCOPY Next Due 3/18/2022      Done 3/18/2012           Patient Care Team:  Pepe West M.D. as PCP - General      Health Care Screening: Age-appropriate preventive services Medicare covers discussed today and ordered if indicated.    Patient Active Problem List    Diagnosis Date Noted   • Severe episode of recurrent major depressive disorder, with psychotic features (CMS-HCC) 02/13/2018   • Bicornate uterus 02/02/2017   • Chronic obstructive pulmonary disease (CMS-HCC) 01/23/2017   • Tobacco dependence 01/10/2017   • Polycythemia 05/31/2011   • Bipolar 2 disorder (CMS-HCC) 04/30/2010   • Dyslipidemia 04/30/2010       Current Outpatient Prescriptions   Medication Sig Dispense Refill   • atorvastatin (LIPITOR) 40 MG Tab Take 1 Tab by mouth every day. 90 Tab 3   • ibuprofen (MOTRIN) 200 MG Tab Take 200 mg by mouth every 6 hours as needed.       No current facility-administered medications for this visit.        Family History   Problem Relation Age of Onset   • Cancer Mother      breast   • Cancer Father      stomach   • Cancer Sister      colon   • Heart Disease Maternal Grandmother        Social History     Social History   • Marital status: Single     Spouse name: N/A   • Number of children: N/A   • Years of education: N/A     Occupational History   • Not on file.     Social History Main Topics   • Smoking status: Current Every Day Smoker     Packs/day: 1.00     Years: 46.00     Types: Cigarettes     Start date: 11/15/1971   • Smokeless tobacco: Never Used   • Alcohol use No   • Drug use: No   • Sexual activity: Yes     Partners: Male     Other Topics Concern   • Not on file     Social History Narrative   • No narrative on file       Past Surgical History:   Procedure Laterality Date   • PRIMARY C SECTION  1978  "  • CHOLECYSTECTOMY         Allergies as of 02/13/2018 - Reviewed 02/13/2018   Allergen Reaction Noted   • Codeine  05/20/2010   • Nickel  01/17/2018        ROS: Denies Chest pain, SOB, LE edema.    /66   Pulse 85   Temp 36.7 °C (98.1 °F)   Resp 16   Ht 1.727 m (5' 8\")   Wt 63.5 kg (140 lb)   SpO2 95%   BMI 21.29 kg/m²      Physical Exam:  Gen:         Alert and oriented, No apparent distress.  HEENT:    PERRL, TM right persistent erythema and no exudates  Neck:        No Lymphadenopathy or Bruits.  Lungs:     Clear to auscultation bilaterally  CV:          Regular rate and rhythm. No murmurs, rubs or gallops.  Abd:         Soft non tender, non distended. Normal active bowel sounds.  No  Hepatosplenomegaly, No pulsatile masses.                   Ext:          No clubbing, cyanosis, edema.      Assessment and Plan.   62 y.o. female with the following issues.    1. Medicare annual wellness visit, subsequent  Discussed healthy lifestyle habits as well as screening regimens. Information given on advanced directives  - Annual Wellness Visit - Includes PPPS Subsequent ()    2. Hives  Hives have resolved continue staff steroids if they should return will start on H2 blocker.    3. Earache  This point here thought to be septic it is still erythematous she will keep her appointment with ENT.    4. Severe episode of recurrent major depressive disorder, with psychotic features (CMS-HCC)  Severe depressive symptoms along with other underlying psychiatric disorder she is willing to see psychiatrist but again is very wary of any medications.  - Patient has been identified as being depressed and appropriate orders and counseling have been given    - REFERRAL TO PSYCHIATRY    5. Bipolar 2 disorder (CMS-HCC)  Again have referred to psychiatry.  - Annual Wellness Visit - Includes PPPS Subsequent ()  - REFERRAL TO PSYCHIATRY    6. Chronic obstructive pulmonary disease, unspecified COPD type (CMS-HCC)  Closely " stable refuses inhalers.  - Annual Wellness Visit - Includes PPPS Subsequent ()    7. Dyslipidemia  Maintain on statin tolerating well  - Annual Wellness Visit - Includes PPPS Subsequent ()    8. Tobacco dependence  Recommendations for cessation.  - Annual Wellness Visit - Includes PPPS Subsequent ()        Referrals offered: Community-based lifestyle interventions to reduce health risks and promote self-management and wellness, fall prevention, nutrition, physical activity, tobacco-use cessation, weight loss, and mental health services as per orders if indicated.    Discussion today about general wellness and lifestyle habits:    · Prevent falls and reduce trip hazards; Cautioned about securing or removing rugs.  · Have a working fire alarm and carbon monoxide detector;   · Engage in regular physical activity and social activities

## 2018-04-30 ENCOUNTER — TELEPHONE (OUTPATIENT)
Dept: BEHAVIORAL HEALTH | Facility: PHYSICIAN GROUP | Age: 63
End: 2018-04-30

## 2018-04-30 NOTE — TELEPHONE ENCOUNTER
1. Caller Name: Aimee                                         Call Back Number: [phone]       Pt left a message asking is she would have a co-pay for her up coming appointment, tried calling pt back 4/30/18 @9:35 am no answer.

## 2018-05-10 ENCOUNTER — DOCUMENTATION (OUTPATIENT)
Dept: BEHAVIORAL HEALTH | Facility: PHYSICIAN GROUP | Age: 63
End: 2018-05-10

## 2018-05-10 ENCOUNTER — OFFICE VISIT (OUTPATIENT)
Dept: BEHAVIORAL HEALTH | Facility: PHYSICIAN GROUP | Age: 63
End: 2018-05-10
Payer: MEDICARE

## 2018-05-10 VITALS
WEIGHT: 141 LBS | HEIGHT: 68 IN | DIASTOLIC BLOOD PRESSURE: 82 MMHG | BODY MASS INDEX: 21.37 KG/M2 | HEART RATE: 94 BPM | SYSTOLIC BLOOD PRESSURE: 126 MMHG

## 2018-05-10 DIAGNOSIS — F33.1 MDD (MAJOR DEPRESSIVE DISORDER), RECURRENT EPISODE, MODERATE (HCC): ICD-10-CM

## 2018-05-10 DIAGNOSIS — F51.04 CHRONIC INSOMNIA: ICD-10-CM

## 2018-05-10 DIAGNOSIS — R41.3 MEMORY DEFICITS: ICD-10-CM

## 2018-05-10 PROBLEM — F33.3 SEVERE EPISODE OF RECURRENT MAJOR DEPRESSIVE DISORDER, WITH PSYCHOTIC FEATURES (HCC): Status: RESOLVED | Noted: 2018-02-13 | Resolved: 2018-05-10

## 2018-05-10 PROCEDURE — 99215 OFFICE O/P EST HI 40 MIN: CPT | Performed by: PSYCHIATRY & NEUROLOGY

## 2018-05-10 RX ORDER — ESCITALOPRAM OXALATE 10 MG/1
TABLET ORAL
Qty: 30 TAB | Refills: 1 | Status: SHIPPED | OUTPATIENT
Start: 2018-05-10 | End: 2018-07-08 | Stop reason: SDUPTHER
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
## 2018-06-15 ENCOUNTER — APPOINTMENT (OUTPATIENT)
Dept: BEHAVIORAL HEALTH | Facility: PHYSICIAN GROUP | Age: 63
End: 2018-06-15
Payer: MEDICARE

## 2018-07-16 ENCOUNTER — OFFICE VISIT (OUTPATIENT)
Dept: BEHAVIORAL HEALTH | Facility: PHYSICIAN GROUP | Age: 63
End: 2018-07-16
Payer: MEDICARE

## 2018-07-16 VITALS
HEART RATE: 90 BPM | DIASTOLIC BLOOD PRESSURE: 75 MMHG | HEIGHT: 68 IN | WEIGHT: 143 LBS | BODY MASS INDEX: 21.67 KG/M2 | SYSTOLIC BLOOD PRESSURE: 111 MMHG

## 2018-07-16 DIAGNOSIS — F33.42 MDD (MAJOR DEPRESSIVE DISORDER), RECURRENT, IN FULL REMISSION (HCC): ICD-10-CM

## 2018-07-16 DIAGNOSIS — R41.3 MEMORY DEFICITS: ICD-10-CM

## 2018-07-16 DIAGNOSIS — F51.04 CHRONIC INSOMNIA: ICD-10-CM

## 2018-07-16 PROCEDURE — 99214 OFFICE O/P EST MOD 30 MIN: CPT | Performed by: PSYCHIATRY & NEUROLOGY

## 2018-07-16 RX ORDER — ESCITALOPRAM OXALATE 5 MG/1
5 TABLET ORAL DAILY
Qty: 90 TAB | Refills: 0 | Status: SHIPPED | OUTPATIENT
Start: 2018-07-16 | End: 2018-10-22 | Stop reason: SDUPTHER

## 2018-07-16 ASSESSMENT — PATIENT HEALTH QUESTIONNAIRE - PHQ9: CLINICAL INTERPRETATION OF PHQ2 SCORE: 0

## 2018-07-16 NOTE — PROGRESS NOTES
"PSYCHIATRY FOLLOW-UP NOTE      Chief Complaint   Patient presents with   • Follow-Up     depression, anxiety         History Of Present Illness:  Aimee Guillen is a 62 y.o. old female with major depressive disorder, dyslipidemia comes in today for follow up, was last seen for an initial evaluation 2 months ago.  She reports feeling a lot better in regards to her depression with Lexapro that was started about 2 months ago.  She was unable to tolerate 10 mg dose because of anxiety but has been compliant with 5 mg and has noticed that she is feeling a lot more happier than before.  She denies any current symptoms of depression.  She has noted more energy and improvement in both her appetite and sleep.  She is sleeping about 6-8 hours and is waking up rested.  Denies any side effects that she has noticed with the 5 mg of Lexapro.  She feels that she has been able to perform better at her workplace as well.  Endorses some financial stressors but feels that she is taking appropriate care of them.  Denies any current symptoms of anxiety.  Denies any recent reckless or impulsive behaviors.  She did not talk about insects or bugs but when asked she mentioned having maybe 1 house fly in her home here and there but was not focused on any infestation in her apartment.  Denies having thoughts of wanting to hurt herself or anybody else.    Social History:   She lives alone in an apartment in [place]. She is currently single and has been  and  × 2. She has son who is in his 40's and who in [place] with his family. 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    Substance Use:  Alcohol - Denies  Nicotine - Smokes 1 PPD  Illicit drugs - Denies     Past Medication Trials:  Risperidone (s/e - \"it was physically addicting\"), Abilify (s/e - weight gain)    Medications:  Current Outpatient Prescriptions   Medication Sig Dispense Refill   • escitalopram (LEXAPRO) 5 MG tablet Take 1 Tab by mouth every day. 90 " "Tab 0   • vitamin D (CHOLECALCIFEROL) 1000 UNIT Tab Take 1,000 Units by mouth every day.     • atorvastatin (LIPITOR) 40 MG Tab Take 1 Tab by mouth every day. 90 Tab 3   • ibuprofen (MOTRIN) 200 MG Tab Take 200 mg by mouth every 6 hours as needed.       No current facility-administered medications for this visit.        Review Of Systems:    Constitutional - Negative for fatigue  Respiratory - Negative for shortness of breath, cough  CVS - Negative for chest pain, palpitations  GI - Negative for nausea, vomiting, abdominal pain, diarrhea, constipation  Musculoskeletal - Negative for back pain  Neurological - Negative for headaches. Positive for short term memory problems   Psychiatric - Negative for anxiety, depression, poor sleep    Physical Examination:  Vital signs: /75   Pulse 90   Ht 1.727 m (5' 8\")   Wt 64.9 kg (143 lb)   BMI 21.74 kg/m²     Musculoskeletal: Normal gait. No abnormal movements.     Mental Status Evaluation:   General: Elderly white female, dressed in casual attire, good grooming and hygiene, in no apparent distress, calm and cooperative, good eye contact, no psychomotor agitation or retardation  Orientation: Alert and oriented to person, place and time  Recent and remote memory: Grossly intact  Attention span and concentration: Grossly intact  Speech: Spontaneous, normal rate, rhythm and tone  Thought Process: Linear, logical and goal directed  Thought Content: Denies suicidal or homicidal ideations, intent or plan  Perception: Denies auditory or visual hallucinations. No delusions noted  Associations: Intact  Language: Appropriate  Fund of knowledge and vocabulary: Grossly adequate  Mood: \"am feeling a lot better\"  Affect: Euthymic, mood congruent  Insight: Good  Judgment: Good    Depression screening:  Depression Screen (PHQ-2/PHQ-9) 12/1/2017 2/13/2018 7/16/2018   PHQ-2 Total Score 2 4 0   PHQ-9 Total Score 13 16 -     Interpretation of PHQ-9 Total Score   Score Severity   1-4 No " Depression   5-9 Mild Depression   10-14 Moderate Depression   15-19 Moderately Severe Depression   20-27 Severe Depression    Medical Records/Labs/Diagnostic Tests Reviewed:  [place]  records - no prescribed controlled medications found in the last one year      Impression:  1. Major depressive disorder, recurrent, in full remission - improved   2. Chronic insomnia - improved   3. Memory deficits - stable   4. History of bipolar mood disorder    Plan:  1. Continue Lexapro 5 mg at bedtime for depression  2. Will avoid any sleep aid medications at this time given improvement in insomnia  3. Discussed psychotherapy but she is not interested at this time given improvement in her symptoms    Return to clinic in 4 months or sooner if symptoms worsen    The proposed treatment plan was discussed with the patient who was provided the opportunity to ask questions and make suggestions regarding alternative treatment. Patient verbalized understanding and expressed agreement with the plan.     Samaria Weiss M.D.  07/16/18    This note was created using voice recognition software (Dragon). The accuracy of the dictation is limited by the abilities of the software. I have reviewed the note prior to signing, however some errors in grammar and context are still possible. If you have any questions related to this note please do not hesitate to contact our office.

## 2018-10-22 RX ORDER — ESCITALOPRAM OXALATE 5 MG/1
TABLET ORAL
Qty: 90 TAB | Refills: 0 | Status: SHIPPED | OUTPATIENT
Start: 2018-10-22 | End: 2018-11-13 | Stop reason: SDUPTHER

## 2018-10-23 ENCOUNTER — TELEPHONE (OUTPATIENT)
Dept: MEDICAL GROUP | Facility: MEDICAL CENTER | Age: 63
End: 2018-10-23

## 2018-10-30 ENCOUNTER — OFFICE VISIT (OUTPATIENT)
Dept: MEDICAL GROUP | Facility: MEDICAL CENTER | Age: 63
End: 2018-10-30
Payer: MEDICARE

## 2018-10-30 ENCOUNTER — HOSPITAL ENCOUNTER (OUTPATIENT)
Dept: LAB | Facility: MEDICAL CENTER | Age: 63
End: 2018-10-30
Attending: INTERNAL MEDICINE
Payer: MEDICARE

## 2018-10-30 VITALS
TEMPERATURE: 98 F | OXYGEN SATURATION: 93 % | BODY MASS INDEX: 21.55 KG/M2 | WEIGHT: 142.2 LBS | SYSTOLIC BLOOD PRESSURE: 100 MMHG | HEART RATE: 82 BPM | DIASTOLIC BLOOD PRESSURE: 62 MMHG | HEIGHT: 68 IN

## 2018-10-30 DIAGNOSIS — D75.1 POLYCYTHEMIA: ICD-10-CM

## 2018-10-30 DIAGNOSIS — Z23 NEED FOR VACCINATION: ICD-10-CM

## 2018-10-30 DIAGNOSIS — R10.13 EPIGASTRIC PAIN: ICD-10-CM

## 2018-10-30 DIAGNOSIS — J44.9 CHRONIC OBSTRUCTIVE PULMONARY DISEASE, UNSPECIFIED COPD TYPE (HCC): ICD-10-CM

## 2018-10-30 DIAGNOSIS — R91.8 ABNORMAL CT LUNG SCREENING: ICD-10-CM

## 2018-10-30 DIAGNOSIS — R13.19 ESOPHAGEAL DYSPHAGIA: ICD-10-CM

## 2018-10-30 LAB
ALBUMIN SERPL BCP-MCNC: 4.8 G/DL (ref 3.2–4.9)
ALBUMIN/GLOB SERPL: 1.5 G/DL
ALP SERPL-CCNC: 92 U/L (ref 30–99)
ALT SERPL-CCNC: 12 U/L (ref 2–50)
ANION GAP SERPL CALC-SCNC: 9 MMOL/L (ref 0–11.9)
AST SERPL-CCNC: 16 U/L (ref 12–45)
BASOPHILS # BLD AUTO: 1.2 % (ref 0–1.8)
BASOPHILS # BLD: 0.17 K/UL (ref 0–0.12)
BILIRUB SERPL-MCNC: 0.7 MG/DL (ref 0.1–1.5)
BUN SERPL-MCNC: 13 MG/DL (ref 8–22)
CALCIUM SERPL-MCNC: 10.3 MG/DL (ref 8.5–10.5)
CHLORIDE SERPL-SCNC: 105 MMOL/L (ref 96–112)
CO2 SERPL-SCNC: 26 MMOL/L (ref 20–33)
CREAT SERPL-MCNC: 0.83 MG/DL (ref 0.5–1.4)
EOSINOPHIL # BLD AUTO: 0.07 K/UL (ref 0–0.51)
EOSINOPHIL NFR BLD: 0.5 % (ref 0–6.9)
ERYTHROCYTE [DISTWIDTH] IN BLOOD BY AUTOMATED COUNT: 46.7 FL (ref 35.9–50)
FASTING STATUS PATIENT QL REPORTED: NORMAL
GLOBULIN SER CALC-MCNC: 3.1 G/DL (ref 1.9–3.5)
GLUCOSE SERPL-MCNC: 109 MG/DL (ref 65–99)
HCT VFR BLD AUTO: 50.6 % (ref 37–47)
HGB BLD-MCNC: 17.1 G/DL (ref 12–16)
IMM GRANULOCYTES # BLD AUTO: 0.05 K/UL (ref 0–0.11)
IMM GRANULOCYTES NFR BLD AUTO: 0.4 % (ref 0–0.9)
LIPASE SERPL-CCNC: 31 U/L (ref 11–82)
LYMPHOCYTES # BLD AUTO: 3.55 K/UL (ref 1–4.8)
LYMPHOCYTES NFR BLD: 26 % (ref 22–41)
MCH RBC QN AUTO: 31.7 PG (ref 27–33)
MCHC RBC AUTO-ENTMCNC: 33.8 G/DL (ref 33.6–35)
MCV RBC AUTO: 93.9 FL (ref 81.4–97.8)
MONOCYTES # BLD AUTO: 0.74 K/UL (ref 0–0.85)
MONOCYTES NFR BLD AUTO: 5.4 % (ref 0–13.4)
NEUTROPHILS # BLD AUTO: 9.1 K/UL (ref 2–7.15)
NEUTROPHILS NFR BLD: 66.5 % (ref 44–72)
NRBC # BLD AUTO: 0 K/UL
NRBC BLD-RTO: 0 /100 WBC
PLATELET # BLD AUTO: 243 K/UL (ref 164–446)
PMV BLD AUTO: 12.3 FL (ref 9–12.9)
POTASSIUM SERPL-SCNC: 4.3 MMOL/L (ref 3.6–5.5)
PROT SERPL-MCNC: 7.9 G/DL (ref 6–8.2)
RBC # BLD AUTO: 5.39 M/UL (ref 4.2–5.4)
SODIUM SERPL-SCNC: 140 MMOL/L (ref 135–145)
WBC # BLD AUTO: 13.7 K/UL (ref 4.8–10.8)

## 2018-10-30 PROCEDURE — G0008 ADMIN INFLUENZA VIRUS VAC: HCPCS | Performed by: INTERNAL MEDICINE

## 2018-10-30 PROCEDURE — 85025 COMPLETE CBC W/AUTO DIFF WBC: CPT

## 2018-10-30 PROCEDURE — 80053 COMPREHEN METABOLIC PANEL: CPT

## 2018-10-30 PROCEDURE — 99214 OFFICE O/P EST MOD 30 MIN: CPT | Mod: 25 | Performed by: INTERNAL MEDICINE

## 2018-10-30 PROCEDURE — 90686 IIV4 VACC NO PRSV 0.5 ML IM: CPT | Performed by: INTERNAL MEDICINE

## 2018-10-30 PROCEDURE — 83690 ASSAY OF LIPASE: CPT

## 2018-10-30 PROCEDURE — 36415 COLL VENOUS BLD VENIPUNCTURE: CPT

## 2018-10-30 NOTE — PROGRESS NOTES
CC: abd pain dysphagia    HPI:   Aimee presents today with the following.    1. Esophageal dysphagia  Presents reporting approximately 10 days of epigastric pain and dysphasia.  She reports the symptoms began suddenly.  Food was sticking in the top part of her stomach.  She also had problems belching.  She denies any blood or sore dark tarry stool no nausea or vomiting.    2. Epigastric pain  Pain during this time got to be 8 out of 10 in intensity for short burst but had a background for 10 days of 6 out of 10.  She does not have a gallbladder.  Has no fevers or chills and her pain is now resolved.    3. Chronic obstructive pulmonary disease, unspecified COPD type ([facility])  She does have COPD with polycythemia and CT one year ago showing groundglass opacities.  She was referred to pulmonology but never made the appointment.  She reports her breathing is at baseline but she does run into problems occasionally.        Patient Active Problem List    Diagnosis Date Noted   • MDD (major depressive disorder), recurrent, in full remission ([facility]) 05/10/2018   • Memory deficits 05/10/2018   • Chronic insomnia 05/10/2018   • Graves disease    • Bicornate uterus 02/02/2017   • Chronic obstructive pulmonary disease (HCC) 01/23/2017   • Tobacco dependence 01/10/2017   • Polycythemia 05/31/2011   • Dyslipidemia 04/30/2010       Current Outpatient Prescriptions   Medication Sig Dispense Refill   • escitalopram (LEXAPRO) 5 MG tablet TAKE 1 TABLET BY MOUTH EVERY DAY 90 Tab 0   • atorvastatin (LIPITOR) 40 MG Tab Take 1 Tab by mouth every day. 90 Tab 3   • ibuprofen (MOTRIN) 200 MG Tab Take 200 mg by mouth every 6 hours as needed.     • vitamin D (CHOLECALCIFEROL) 1000 UNIT Tab Take 1,000 Units by mouth every day.       No current facility-administered medications for this visit.          Allergies as of 10/30/2018 - Reviewed 10/30/2018   Allergen Reaction Noted   • Codeine  05/20/2010   • Nickel  01/17/2018        ROS: Denies Chest  "pain, SOB, LE edema.    /62 (BP Location: Left arm, Patient Position: Sitting, BP Cuff Size: Adult)   Pulse 82   Temp 36.7 °C (98 °F) (Temporal)   Ht 1.727 m (5' 8\")   Wt 64.5 kg (142 lb 3.2 oz)   SpO2 93%   BMI 21.62 kg/m²     Physical Exam:  Gen:         Alert and oriented, No apparent distress.  Neck:        No Lymphadenopathy or Bruits.  Lungs:     Clear to auscultation bilaterally  CV:          Regular rate and rhythm. No murmurs, rubs or gallops.               Ext:          No clubbing, cyanosis, edema.      Assessment and Plan.   62 y.o. female with the following issues.    1. Esophageal dysphagia  Have placed referral to gastroenterology.  - REFERRAL TO GASTROENTEROLOGY    2. Epigastric pain  Multiple etiologies suspected pain is now resolved have sent for blood work and ultrasound.  Again referred to GI  - COMP METABOLIC PANEL; Future  - CBC WITH DIFFERENTIAL; Future  - LIPASE; Future  - US-ABDOMEN COMPLETE SURVEY; Future  - REFERRAL TO GASTROENTEROLOGY    3. Chronic obstructive pulmonary disease, unspecified COPD type (HCC)  Placed referral back to pulmonology for evaluation and treatment.      - Influenza Vaccine Quad Injection >3Y (PF)      "

## 2018-11-05 ENCOUNTER — OFFICE VISIT (OUTPATIENT)
Dept: PULMONOLOGY | Facility: HOSPICE | Age: 63
End: 2018-11-05
Payer: MEDICARE

## 2018-11-05 ENCOUNTER — TELEPHONE (OUTPATIENT)
Dept: PULMONOLOGY | Facility: HOSPICE | Age: 63
End: 2018-11-05

## 2018-11-05 VITALS
RESPIRATION RATE: 16 BRPM | BODY MASS INDEX: 22.22 KG/M2 | HEART RATE: 71 BPM | WEIGHT: 146.6 LBS | TEMPERATURE: 97.9 F | OXYGEN SATURATION: 98 % | DIASTOLIC BLOOD PRESSURE: 62 MMHG | HEIGHT: 68 IN | SYSTOLIC BLOOD PRESSURE: 104 MMHG

## 2018-11-05 DIAGNOSIS — R06.00 DYSPNEA, UNSPECIFIED TYPE: Primary | ICD-10-CM

## 2018-11-05 DIAGNOSIS — R91.1 SOLITARY PULMONARY NODULE: ICD-10-CM

## 2018-11-05 DIAGNOSIS — R91.8 PULMONARY NODULES: ICD-10-CM

## 2018-11-05 DIAGNOSIS — J41.0 SIMPLE CHRONIC BRONCHITIS (HCC): Primary | ICD-10-CM

## 2018-11-05 DIAGNOSIS — Z71.6 ENCOUNTER FOR SMOKING CESSATION COUNSELING: ICD-10-CM

## 2018-11-05 PROCEDURE — 99214 OFFICE O/P EST MOD 30 MIN: CPT | Performed by: INTERNAL MEDICINE

## 2018-11-05 RX ORDER — ESCITALOPRAM OXALATE 10 MG/1
TABLET ORAL
COMMUNITY
Start: 2018-10-22 | End: 2018-11-13

## 2018-11-05 RX ORDER — ALBUTEROL SULFATE 90 UG/1
2 AEROSOL, METERED RESPIRATORY (INHALATION) EVERY 4 HOURS PRN
OUTPATIENT
Start: 2018-11-05

## 2018-11-05 ASSESSMENT — ENCOUNTER SYMPTOMS
HEMOPTYSIS: 0
TREMORS: 0
DIARRHEA: 0
FOCAL WEAKNESS: 0
HEARTBURN: 0
NAUSEA: 0
WEAKNESS: 0
FEVER: 0
ABDOMINAL PAIN: 0
SHORTNESS OF BREATH: 0
STRIDOR: 0
COUGH: 1
NERVOUS/ANXIOUS: 0
DIZZINESS: 0
HEADACHES: 0
BRUISES/BLEEDS EASILY: 0
HALLUCINATIONS: 0
LOSS OF CONSCIOUSNESS: 0
MEMORY LOSS: 0
SPUTUM PRODUCTION: 1
CHILLS: 0
PALPITATIONS: 0
SORE THROAT: 0
WEIGHT LOSS: 0
VOMITING: 0
INSOMNIA: 0
CONSTIPATION: 0
SINUS PAIN: 0
WHEEZING: 0
POLYDIPSIA: 0

## 2018-11-05 NOTE — PROGRESS NOTES
Subjective:      Aimee Guillen is a 62 y.o. female who presents with [facility] (Referred by [facility] for COPD, Abnormal CT)            HPI  Patient is 62 years old female who sees us for the first time today for groundglass opacity seen on CT chest back in 2017.  The patient is a lifetime smoker and continues to smoke 1 pack a day.  Her total smoking history is 45 pack years.  She never had PFTs performed before.  The patient denies weight loss and change in appetite.  She has no hemoptysis.  She has no recent pneumonia or admission for any respiratory problem within the last 2 or 3 years.  The patient does not take any inhalers.  Back in February 2017, CT chest performed for lung cancer screening and showed a 2 mm left upper lobe nodule and mild bilateral upper lobe groundglass opacities.  The patient appears to have lost follow-up since then.  She does admit to coughing up yellowish sputum daily and becomes short of breath when she hurries on the level ground or climbing stairs.  Other than that, she is able to walk 1 block and then she will have to stop to catch her breath.    Review of Systems   Constitutional: Negative for chills, fever, malaise/fatigue and weight loss.   HENT: Negative for congestion, hearing loss, sinus pain and sore throat.    Respiratory: Positive for cough and sputum production. Negative for hemoptysis, shortness of breath, wheezing and stridor.    Cardiovascular: Negative for chest pain and palpitations.   Gastrointestinal: Negative for abdominal pain, constipation, diarrhea, heartburn, nausea and vomiting.   Skin: Negative for itching and rash.   Neurological: Negative for dizziness, tremors, focal weakness, loss of consciousness, weakness and headaches.   Endo/Heme/Allergies: Negative for environmental allergies and polydipsia. Does not bruise/bleed easily.   Psychiatric/Behavioral: Negative for hallucinations and memory loss. The patient is not nervous/anxious and does  "not have insomnia.      Social History     Social History   • Marital status: Single     Spouse name: N/A   • Number of children: N/A   • Years of education: N/A     Occupational History   • Not on file.     Social History Main Topics   • Smoking status: Current Every Day Smoker     Packs/day: 1.00     Years: 44.00     Types: Cigarettes     Start date: 11/15/1971   • Smokeless tobacco: Never Used      Comment: started at 19   • Alcohol use No   • Drug use: No   • Sexual activity: No     Other Topics Concern   • Not on file     Social History Narrative   • No narrative on file     Family History   Problem Relation Age of Onset   • Cancer Mother         breast   • Cancer Father         stomach   • Bipolar disorder Father    • Cancer Sister         colon   • Heart Disease Maternal Grandmother    • Depression Brother      Past Medical History:   Diagnosis Date   • Anxiety    • Bipolar 2 disorder (HCC) 4/30/2010   • Bipolar disorder (HCC)    • Depression    • Dyslipidemia    • [place] measles    • Graves disease    • HTN (hypertension), benign 4/30/2010   • Hyperthyroidism    • Influenza    • Pneumonia    • Tonsillitis    • Venereal disease      Past Surgical History:   Procedure Laterality Date   • PRIMARY C SECTION  1978   • CHOLECYSTECTOMY     • TONSILLECTOMY      1972          Objective:     /62 (BP Location: Left arm, Patient Position: Sitting, BP Cuff Size: Adult)   Pulse 71   Temp 36.6 °C (97.9 °F) (Temporal)   Resp 16   Ht 1.727 m (5' 8\")   Wt 66.5 kg (146 lb 9.6 oz)   SpO2 98%   BMI 22.29 kg/m²      Physical Exam   Constitutional: She is oriented to person, place, and time. She appears well-developed and well-nourished. No distress.   HENT:   Head: Normocephalic and atraumatic.   Neck: Normal range of motion. Neck supple. No JVD present. No tracheal deviation present. No thyromegaly present.   Cardiovascular: Normal rate, regular rhythm, normal heart sounds and intact distal pulses.  Exam reveals no " gallop and no friction rub.    No murmur heard.  Pulmonary/Chest: Effort normal and breath sounds normal. No stridor. No respiratory distress. She has no wheezes. She has no rales. She exhibits no tenderness.   Abdominal: Soft. She exhibits no distension. There is no tenderness.   Lymphadenopathy:     She has no cervical adenopathy.   Neurological: She is alert and oriented to person, place, and time.   Skin: Skin is warm and dry. No rash noted. She is not diaphoretic. No erythema. No pallor.   Psychiatric: She has a normal mood and affect. Her behavior is normal. Judgment and thought content normal.   Nursing note and vitals reviewed.         No PFTs on file    No Echo on file     CT chest 2/2017    1.  There are no suspicious lung nodules.  2.  There is a single tiny 2 mm posterior left upper lobe nodule abutting the fissure.  3.  Ill-defined groundglass opacities in both anteromedial upper lobes with an appearance most suggestive of atelectasis or some motion artifact. This is of doubtful clinical significance.    Lung RADS: 2 - Benign Appearance or Behavior: Nodules with a very low likelihood of becoming a clinically active cancer due to size or lack of growth    Findings: solid nodule(s): less than 6 mm or new less than 4 mm  part solid nodule(s): less than 6 mm total diameter on baseline screening  non solid nodule(s) (GGN): less than 20 mm OR greater than or equal to 20 mm and unchanged or slowly growing  category 3 or 4 nodules unchanged for greater than or equal to 3 months     Assessment/Plan:     1. Simple chronic bronchitis (HCC)  Mild COPD GOLD A, mMRC 1  Rx: albuterol inhaler 2 Puff; Inhale 2 Puffs by mouth every four hours as needed for Shortness of Breath.  Full PFTs ordered    2.  Solitary pulmonary nodule left upper lobe 2 mm back in February 2017  CT chest for follow-up ordered given high risk    3.  Active smoker  Counseled on smoking cessation, patient is not willing to quit in the meantime,  "patient has no specific reason but states \"when I want a cigarette then I want one\"  Offered classes and pharmacological therapy when patient is willing to    4.  Depression on escitalopram  5.  Secondary polycythemia; hematocrit 50%  Noted after the patient left the building, will perform home oxygen evaluation next visit     Return to clinic in 6 months with CT chest and PFTs.      "

## 2018-11-09 ENCOUNTER — APPOINTMENT (OUTPATIENT)
Dept: RADIOLOGY | Facility: MEDICAL CENTER | Age: 63
End: 2018-11-09
Attending: INTERNAL MEDICINE
Payer: MEDICARE

## 2018-11-09 ENCOUNTER — APPOINTMENT (OUTPATIENT)
Dept: PULMONOLOGY | Facility: HOSPICE | Age: 63
End: 2018-11-09
Payer: MEDICARE

## 2018-11-13 ENCOUNTER — OFFICE VISIT (OUTPATIENT)
Dept: BEHAVIORAL HEALTH | Facility: CLINIC | Age: 63
End: 2018-11-13
Payer: MEDICARE

## 2018-11-13 VITALS
HEIGHT: 68 IN | SYSTOLIC BLOOD PRESSURE: 131 MMHG | HEART RATE: 82 BPM | BODY MASS INDEX: 22.28 KG/M2 | DIASTOLIC BLOOD PRESSURE: 89 MMHG | WEIGHT: 147 LBS

## 2018-11-13 DIAGNOSIS — R41.3 MEMORY DEFICITS: ICD-10-CM

## 2018-11-13 DIAGNOSIS — F51.04 CHRONIC INSOMNIA: ICD-10-CM

## 2018-11-13 DIAGNOSIS — F33.42 MDD (MAJOR DEPRESSIVE DISORDER), RECURRENT, IN FULL REMISSION (HCC): ICD-10-CM

## 2018-11-13 PROCEDURE — 99214 OFFICE O/P EST MOD 30 MIN: CPT | Performed by: PSYCHIATRY & NEUROLOGY

## 2018-11-13 RX ORDER — ESCITALOPRAM OXALATE 5 MG/1
5 TABLET ORAL
Qty: 90 TAB | Refills: 1 | Status: SHIPPED | OUTPATIENT
Start: 2018-11-13

## 2018-11-13 NOTE — PROGRESS NOTES
"PSYCHIATRY FOLLOW-UP NOTE      Chief Complaint   Patient presents with   • Follow-Up     depression         History Of Present Illness:  Aimee Guillen is a 62 y.o. old female with major depressive disorder, dyslipidemia, COPD comes in today for follow up, was last seen 4 months ago.  She reports doing good in regards to her depression since her last visit here.  She has been compliant with Lexapro and feels \"correct\" on it.  She is currently denying any active symptoms of depression.  She continues to work as a caregiver and denies any stress at her workplace.  She continues to have good support from her son and will be spending holidays with him.  She is struggling financially but seems to be making good decisions and improving her finances.  Sleep and appetite continue to be stable.  Denies any recent reckless or impulsive behaviors.  She likes to alvarez once every 2 weeks and spends no more than $5 on it.  She denies any problems at her apartment especially in sex which she had talked about on her initial appointment.  She is still struggling with short-term memory impairments but denies any problems with her ADLs or IADLs.  She denies having thoughts of wanting to hurt herself or others.    Social History:   She lives alone in an apartment in [place]. She is currently single and has been  and  × 2. She has son who is in his 40's and lives in [place] with his family. 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    Substance Use:  Alcohol - Denies  Nicotine - Smokes 1 PPD  Illicit drugs - Denies     Past Medication Trials:  Risperidone (s/e - \"it was physically addicting\"), Abilify (s/e - weight gain)    Medications:  Current Outpatient Prescriptions   Medication Sig Dispense Refill   • escitalopram (LEXAPRO) 5 MG tablet Take 1 Tab by mouth every day. 90 Tab 1   • vitamin D (CHOLECALCIFEROL) 1000 UNIT Tab Take 1,000 Units by mouth every day.     • atorvastatin (LIPITOR) 40 MG Tab Take 1 " "Tab by mouth every day. 90 Tab 3   • ibuprofen (MOTRIN) 200 MG Tab Take 200 mg by mouth every 6 hours as needed.       Current Facility-Administered Medications   Medication Dose Route Frequency Provider Last Rate Last Dose   • albuterol inhaler 2 Puff  2 Puff Inhalation Q4HRS PRN Sandra Restrepo M.D.           Review Of Systems:    Constitutional - Negative for fatigue  Respiratory - Negative for shortness of breath, cough  CVS - Negative for chest pain, palpitations  GI - Negative for nausea, vomiting, abdominal pain, diarrhea, constipation  Musculoskeletal - Negative for back pain  Neurological - Negative for headaches. Positive for short term memory problems   Psychiatric - Negative for anxiety, depression, poor sleep    Physical Examination:  Vital signs: /89   Pulse 82   Ht 1.727 m (5' 8\")   Wt 66.7 kg (147 lb)   BMI 22.35 kg/m²     Musculoskeletal: Normal gait. No abnormal movements.     Mental Status Evaluation:   General: Elderly white female, dressed in casual attire, good grooming and hygiene, in no apparent distress, calm and cooperative, good eye contact, no psychomotor agitation or retardation  Orientation: Alert and oriented to person, place and time  Recent and remote memory: Grossly intact  Attention span and concentration: Grossly intact  Speech: Spontaneous, normal rate, rhythm and tone  Thought Process: Linear, logical and goal directed  Thought Content: Denies suicidal or homicidal ideations, intent or plan  Perception: Denies auditory or visual hallucinations. No delusions noted  Associations: Intact  Language: Appropriate  Fund of knowledge and vocabulary: Grossly adequate  Mood: \"doing well\"  Affect: Euthymic, mood congruent  Insight: Good  Judgment: Good    Depression screening:  Depression Screen (PHQ-2/PHQ-9) 12/1/2017 2/13/2018 7/16/2018   PHQ-2 Total Score 2 4 0   PHQ-9 Total Score 13 16 -     Interpretation of PHQ-9 Total Score   Score Severity   1-4 No Depression   5-9 Mild " Depression   10-14 Moderate Depression   15-19 Moderately Severe Depression   20-27 Severe Depression    Medical Records/Labs/Diagnostic Tests Reviewed:  [place]  records - no prescribed controlled medications found in the last one year      Impression:  1. Major depressive disorder, recurrent, in full remission - stable  2. Chronic insomnia - stable  3. Memory deficits - stable   4. History of bipolar mood disorder    Plan:  1. Continue Lexapro 5 mg at bedtime for depression.  2. Continue to avoid sleep aid medications since she is sleeping better with adequate control of her depressive symptoms.    Return to clinic in 6 months or sooner if symptoms worsen    The proposed treatment plan was discussed with the patient who was provided the opportunity to ask questions and make suggestions regarding alternative treatment. Patient verbalized understanding and expressed agreement with the plan.     Samaria Weiss M.D.  11/13/18    This note was created using voice recognition software (Dragon). The accuracy of the dictation is limited by the abilities of the software. I have reviewed the note prior to signing, however some errors in grammar and context are still possible. If you have any questions related to this note please do not hesitate to contact our office.

## 2018-11-30 ENCOUNTER — PATIENT OUTREACH (OUTPATIENT)
Dept: OTHER | Facility: MEDICAL CENTER | Age: 63
End: 2018-11-30

## 2018-12-17 ENCOUNTER — PATIENT OUTREACH (OUTPATIENT)
Dept: OTHER | Facility: MEDICAL CENTER | Age: 63
End: 2018-12-17

## 2019-02-08 ENCOUNTER — PATIENT OUTREACH (OUTPATIENT)
Dept: OTHER | Facility: MEDICAL CENTER | Age: 64
End: 2019-02-08

## 2019-05-22 ENCOUNTER — TELEPHONE (OUTPATIENT)
Dept: PULMONOLOGY | Facility: HOSPICE | Age: 64
End: 2019-05-22